# Patient Record
Sex: FEMALE | Race: WHITE | NOT HISPANIC OR LATINO | Employment: UNEMPLOYED | URBAN - METROPOLITAN AREA
[De-identification: names, ages, dates, MRNs, and addresses within clinical notes are randomized per-mention and may not be internally consistent; named-entity substitution may affect disease eponyms.]

---

## 2017-01-03 ENCOUNTER — OFFICE VISIT (OUTPATIENT)
Dept: FAMILY MEDICINE | Facility: CLINIC | Age: 62
End: 2017-01-03

## 2017-01-03 VITALS
BODY MASS INDEX: 41.98 KG/M2 | WEIGHT: 277 LBS | TEMPERATURE: 99 F | DIASTOLIC BLOOD PRESSURE: 90 MMHG | SYSTOLIC BLOOD PRESSURE: 142 MMHG | HEART RATE: 76 BPM | OXYGEN SATURATION: 98 % | HEIGHT: 68 IN

## 2017-01-03 DIAGNOSIS — E78.5 HYPERLIPIDEMIA WITH TARGET LDL LESS THAN 100: ICD-10-CM

## 2017-01-03 DIAGNOSIS — E66.01 MORBID OBESITY, UNSPECIFIED OBESITY TYPE (H): ICD-10-CM

## 2017-01-03 DIAGNOSIS — Z79.4 TYPE 2 DIABETES MELLITUS WITHOUT COMPLICATION, WITH LONG-TERM CURRENT USE OF INSULIN (H): Primary | ICD-10-CM

## 2017-01-03 DIAGNOSIS — E11.9 TYPE 2 DIABETES MELLITUS WITHOUT COMPLICATION, WITH LONG-TERM CURRENT USE OF INSULIN (H): Primary | ICD-10-CM

## 2017-01-03 LAB — HBA1C MFR BLD: 5.7 % (ref 4.3–6)

## 2017-01-03 PROCEDURE — 99207 C FOOT EXAM  NO CHARGE: CPT | Performed by: FAMILY MEDICINE

## 2017-01-03 PROCEDURE — 82043 UR ALBUMIN QUANTITATIVE: CPT | Performed by: FAMILY MEDICINE

## 2017-01-03 PROCEDURE — 80061 LIPID PANEL: CPT | Performed by: FAMILY MEDICINE

## 2017-01-03 PROCEDURE — 99214 OFFICE O/P EST MOD 30 MIN: CPT | Performed by: FAMILY MEDICINE

## 2017-01-03 PROCEDURE — 36415 COLL VENOUS BLD VENIPUNCTURE: CPT | Performed by: FAMILY MEDICINE

## 2017-01-03 PROCEDURE — 83036 HEMOGLOBIN GLYCOSYLATED A1C: CPT | Performed by: FAMILY MEDICINE

## 2017-01-03 PROCEDURE — 82565 ASSAY OF CREATININE: CPT | Performed by: FAMILY MEDICINE

## 2017-01-03 RX ORDER — INSULIN GLARGINE 100 [IU]/ML
45 INJECTION, SOLUTION SUBCUTANEOUS AT BEDTIME
Qty: 13.5 ML | Refills: 3 | Status: SHIPPED
Start: 2017-01-03 | End: 2017-08-02

## 2017-01-03 NOTE — PROGRESS NOTES
SUBJECTIVE:                                                    Evette Herzog is a 61 year old female who presents to clinic today for the following health issues:      Diabetes Follow-up    Patient is checking blood sugars: once daily.  Results are as follows:         am - 10:00am (200-250)    Diabetic concerns: None     Symptoms of hypoglycemia (low blood sugar): none     Paresthesias (numbness or burning in feet) or sores: No, they are starting to tingle a little bit     Date of last diabetic eye exam: Over 1 year     Hyperlipidemia Follow-Up      Rate your low fat/cholesterol diet?: not monitoring fat    Taking statin?  No    Other lipid medications/supplements?:  None  LDL CHOLESTEROL CALCULATED   Date Value Ref Range Status   12/14/2015 115* <100 mg/dL Final     Comment:     Above desirable:  100-129 mg/dl   Borderline High:  130-159 mg/dL   High:             160-189 mg/dL   Very high:       >189 mg/dl     ]      Weight Loss  Has lost 50 lbs, mood has improved. She has more motivation to get out and about.  She's cut back sugars and carbs.        Amount of exercise or physical activity: Cleans house and plays with dog everyday    Problems taking medications regularly: No    Medication side effects: none    Diet: regular (no restrictions)    Problem list and histories reviewed & adjusted, as indicated.  Additional history: as documented    Patient Active Problem List   Diagnosis     Insomnia     Oral lichen planus     Hyperlipidemia with target LDL less than 100     Type 2 diabetes mellitus without complication (H)     Morbid obesity, unspecified obesity type (H)     History reviewed. No pertinent past surgical history.    Social History   Substance Use Topics     Smoking status: Former Smoker     Types: Cigarettes     Smokeless tobacco: Never Used      Comment: very occasionally     Alcohol Use: Yes      Comment: 4  beers daily     Family History   Problem Relation Age of Onset     Adopted: Yes      "    Current Outpatient Prescriptions   Medication Sig Dispense Refill     insulin glargine (LANTUS) 100 UNIT/ML injection Inject 45 Units Subcutaneous At Bedtime Needs labs and office visit prior to more refills. Please profile. 13.5 mL 3     insulin syringe-needle U-100 (BD INSULIN SYRINGE ULTRAFINE) 30G X 1/2\" 0.5 ML Use one syringe twice daily or as directed. 100 each 0     STATIN NOT PRESCRIBED, INTENTIONAL, Statin not prescribed intentionally due to Other patient declines (This option does not exclude patient from measure) 0 each 0     aspirin 81 MG tablet Take 1 tablet by mouth daily.  3     ACE NOT PRESCRIBED (INTENTIONAL) not prescribed  0     FIBER PO CHEW 2 daily       MULTIVITAL OR CHEW Chew 1 tablet daily       TUMS 500 MG OR CHEW 1 TABLET 3 TIMES DAILY 90 0     [DISCONTINUED] insulin glargine (LANTUS) 100 UNIT/ML injection Inject 50 Units Subcutaneous At Bedtime Needs labs and office visit prior to more refills. 1 vial 0     No Known Allergies  Recent Labs   Lab Test  01/03/17   1120  12/14/15   0916  01/14/15   1122   01/31/14   1112   01/22/13   1221   A1C  5.7  6.2*  7.0*   < >  6.8*   < >  7.9*   LDL   --   115*  98   --   123   --   123   HDL   --   51  38*   --   50   --   46*   TRIG   --   168*  173*   --   186*   --   211*   ALT   --    --   23   --   59*   --   42   CR   --   0.71  0.56   --   0.63   --   0.60   GFRESTIMATED   --   84  >90  Non  GFR Calc     --   >90   --   >90   GFRESTBLACK   --   >90   GFR Calc    >90   GFR Calc     --   >90   --   >90   POTASSIUM   --   4.2  4.0   --   4.1   --   4.5   TSH   --   6.66*   --    --   4.75   --   2.78    < > = values in this interval not displayed.      BP Readings from Last 3 Encounters:   01/03/17 142/90   12/14/15 124/82   01/14/15 124/70    Wt Readings from Last 3 Encounters:   01/03/17 277 lb (125.646 kg)   12/14/15 296 lb 8 oz (134.492 kg)   01/14/15 305 lb 8 oz (138.574 kg)                " "    ROS:  Constitutional, HEENT, cardiovascular, pulmonary, GI, , musculoskeletal, neuro, skin, endocrine and psych systems are negative, except as otherwise noted.    OBJECTIVE:                                                    /90 mmHg  Pulse 76  Temp(Src) 99  F (37.2  C) (Tympanic)  Ht 5' 7.5\" (1.715 m)  Wt 277 lb (125.646 kg)  BMI 42.72 kg/m2  SpO2 98%  Body mass index is 42.72 kg/(m^2).  GENERAL: healthy, alert and no distress  EYES: Eyes grossly normal to inspection, PERRL and conjunctivae and sclerae normal  NECK: no adenopathy, no asymmetry, masses, or scars and thyroid normal to palpation  RESP: lungs clear to auscultation - no rales, rhonchi or wheezes  CV: regular rate and rhythm, normal S1 S2, no S3 or S4, no murmur, click or rub, no peripheral edema and peripheral pulses strong  ABDOMEN: soft, nontender, no hepatosplenomegaly, no masses and bowel sounds normal  MS: no gross musculoskeletal defects noted, no edema  NEURO: Normal strength and tone, mentation intact and speech normal  PSYCH: mentation appears normal, affect normal/bright  Diabetic foot exam: normal DP and PT pulses, no trophic changes or ulcerative lesions and normal sensory exam    Diagnostic Test Results:  Results for orders placed or performed in visit on 01/03/17 (from the past 24 hour(s))   Hemoglobin A1c   Result Value Ref Range    Hemoglobin A1C 5.7 4.3 - 6.0 %        ASSESSMENT/PLAN:                                                    1. Hyperlipidemia with target LDL less than 100  LDL CHOLESTEROL CALCULATED   Date Value Ref Range Status   12/14/2015 115* <100 mg/dL Final     Comment:     Above desirable:  100-129 mg/dl   Borderline High:  130-159 mg/dL   High:             160-189 mg/dL   Very high:       >189 mg/dl     ] not at goal, but ongoing weight loss, diet changes, recheck today, patient declines statin, recommend omega-3 fatty acids if she desires  - Lipid panel reflex to direct LDL    2. Type 2 diabetes " mellitus without complication, with long-term current use of insulin (H)  Doing very well, stopped glipizide and reduced lantus from 50 to 45 units qhs, patient to follow blood sugars and let me know  - insulin glargine (LANTUS) 100 UNIT/ML injection; Inject 45 Units Subcutaneous At Bedtime Needs labs and office visit prior to more refills. Please profile.  Dispense: 13.5 mL; Refill: 3  - FOOT EXAM    3. Morbid obesity, unspecified obesity type (H)  Wt Readings from Last 4 Encounters:   01/03/17 277 lb (125.646 kg)   12/14/15 296 lb 8 oz (134.492 kg)   01/14/15 305 lb 8 oz (138.574 kg)   06/20/14 320 lb (145.151 kg)    excellent ongoing weight loss, keep up great job!      Return to clinic 6 months  Reminded to schedule preventive cares, she has poor health insurance, recommended JALEN, Please see patient instructions below.     Health Maintenance Due   Topic Date Due     PNEUMOVAX 1X HI RISK PATIENT < 65 (NO IB MSG)  01/31/1956     HEPATITIS C SCREENING  01/31/1972     PAP SCREENING Q3 YR (SYSTEM ASSIGNED)  01/31/1975     MAMMO SCREEN Q2 YR (SYSTEM ASSIGNED)  01/31/1994     COLON CANCER SCREEN (SYSTEM ASSIGNED)  01/31/2004     FOOT EXAM Q1 YEAR( NO INBASKET)  06/20/2015     A1C Q6 MO( NO INBASKET)  06/14/2016     EYE EXAM Q1 YEAR( NO INBASKET)  07/09/2016     CREATININE Q1 YEAR (NO INBASKET)  12/14/2016     LIPID MONITORING Q1 YEAR( NO INBASKET)  12/14/2016     MICROALBUMIN Q1 YEAR( NO INBASKET)  12/14/2016      Pneumovax recommended today, patient declined.    Melva Johnson MD  [facility]

## 2017-01-03 NOTE — PATIENT INSTRUCTIONS
Results for orders placed or performed in visit on 01/03/17   Hemoglobin A1c   Result Value Ref Range    Hemoglobin A1C 5.7 4.3 - 6.0 %      [facility]- Breast and Cervical Cancer Screening  Agency: [facility]  [address] [place] [place]  [place], [place], [address]   [phone]  Service Description  The [facility] Screening Program (formerly the [place] Breast and Cervical Cancer Control Program) is a statewide comprehensive breast and cervical cancer screening program whose primary objective is to increase the proportion of age-appropriate women who are screened for breast and cervical cancer by providing free screening and follow-up services to uninsured and underinsured women.    The following services are free of charge to eligible women.    Screening services  - Office visit for breast and/or cervical exam  - Screening mammogram (breast exam required)  - Pap smear    Diagnostic services  - Office visit for breast or cervical services (e.g. for exam or results counseling)  - Diagnostic mammogram  - Fine needle aspiration of breast lump, including pathology reading  - Colposcopy, including biopsy  - Endometrial Biopsy (when done as a follow-up for a [facility]ram-covered Pap with abnormal results of Endometrial cells or Adenocarcinoma)  - High Risk Human Papillomavirus (HPV) Panels (when done as a follow-up for a [facility]-covered Pap with Atypical Cells of Undetermined Significance (ASCUS) results)  - Breast ultrasound  - Breast biopsy (with prior arrangement)  Eligibility  Women who meet all three criteria are eligible: Age 40 or older (see exception below*); Have no insurance or are underinsured**; Meet income guidelines ([email or web address]/[email or web address]/services.[email or web address])    * The [facility] Program recognizes that there are some situations where services are indicated in younger women. If a woman is determined by a clinician to be at elevated risk for breast cancer, [facility] will cover her office  visit and mammogram. If further follow-up is needed, the woman could also have a diagnostic mammogram, breast ultrasound, or outpatient breast biopsy.    ** Underinsured includes: Insurance that does not cover screening or insurance with unmet deductibles or copayments. Women on Medicare where there are uncovered expenses associated with the visit, Pap, or mammogram. Women that are on either [place] or Medical Assistance are not eligible for [employer] (because both cover these screening services).  Application Instructions  To assist in determining eligibility and scheduling an appointment for a free breast and cervical cancer screening appointment at a participating [employer] clinic, fill in the form at [email or web address].    For [place] information call  at [phone] or Toll Free [phone] ([phone])      .You are due for a mammogram. Please call [phone] to schedule one at your earliest convenience, thank you!

## 2017-01-03 NOTE — PROGRESS NOTES
Quick Note:    Patient was seen today in clinic. I discussed results in clinic, please see clinic progress note.    Melva Johnson 1/3/2017  ______

## 2017-01-03 NOTE — MR AVS SNAPSHOT
After Visit Summary   1/3/2017    Evette Herzog    MRN: [id number]           Patient Information     Date Of Birth          1955        Visit Information        Provider Department      1/3/2017 1:40 PM Melva Johnson MD [facility]        Today's Diagnoses     Type 2 diabetes mellitus without complication, with long-term current use of insulin (H)    -  1     Hyperlipidemia with target LDL less than 100           Care Instructions    Results for orders placed or performed in visit on 01/03/17   Hemoglobin A1c   Result Value Ref Range    Hemoglobin A1C 5.7 4.3 - 6.0 %      [facility]- Breast and Cervical Cancer Screening  Agency: [facility]  [address] [place], [address]   [phone]  Service Description  The [facility] Screening Program (formerly the [place] Breast and Cervical Cancer Control Program) is a statewide comprehensive breast and cervical cancer screening program whose primary objective is to increase the proportion of age-appropriate women who are screened for breast and cervical cancer by providing free screening and follow-up services to uninsured and underinsured women.    The following services are free of charge to eligible women.    Screening services  - Office visit for breast and/or cervical exam  - Screening mammogram (breast exam required)  - Pap smear    Diagnostic services  - Office visit for breast or cervical services (e.g. for exam or results counseling)  - Diagnostic mammogram  - Fine needle aspiration of breast lump, including pathology reading  - Colposcopy, including biopsy  - Endometrial Biopsy (when done as a follow-up for a [facility]ram-covered Pap with abnormal results of Endometrial cells or Adenocarcinoma)  - High Risk Human Papillomavirus (HPV) Panels (when done as a follow-up for a [facility]-covered Pap with Atypical Cells of Undetermined Significance (ASCUS) results)  - Breast ultrasound  - Breast biopsy (with  prior arrangement)  Eligibility  Women who meet all three criteria are eligible: Age 40 or older (see exception below*); Have no insurance or are underinsured**; Meet income guidelines ([email or web address]/[email or web address]/[email or web address])    * The [facility] Program recognizes that there are some situations where services are indicated in younger women. If a woman is determined by a clinician to be at elevated risk for breast cancer, [facility] will cover her office visit and mammogram. If further follow-up is needed, the woman could also have a diagnostic mammogram, breast ultrasound, or outpatient breast biopsy.    ** Underinsured includes: Insurance that does not cover screening or insurance with unmet deductibles or copayments. Women on Medicare where there are uncovered expenses associated with the visit, Pap, or mammogram. Women that are on either [place] or Medical Assistance are not eligible for [facility] (because both cover these screening services).  Application Instructions  To assist in determining eligibility and scheduling an appointment for a free breast and cervical cancer screening appointment at a participating [place] clinic, fill in the form at [email or web address].    For [place] information call  at [phone] or Toll Free [phone] ([phone])          Follow-ups after your visit        Who to contact     If you have questions or need follow up information about today's clinic visit or your schedule please contact [facility] directly at [phone].  Normal or non-critical lab and imaging results will be communicated to you by MyChart, letter or phone within 4 business days after the clinic has received the results. If you do not hear from us within 7 days, please contact the clinic through MyChart or phone. If you have a critical or abnormal lab result, we will notify you by phone as soon as  "possible.  Submit refill requests through [employer] or call your pharmacy and they will forward the refill request to us. Please allow 3 business days for your refill to be completed.          Additional Information About Your Visit        [employer] Information     [employer] gives you secure access to your electronic health record. If you see a primary care provider, you can also send messages to your care team and make appointments. If you have questions, please call your primary care clinic.  If you do not have a primary care provider, please call [phone] and they will assist you.        Care EveryWhere ID     This is your Care EveryWhere ID. This could be used by other organizations to access your [facility] medical records  [id number]        Your Vitals Were     Pulse Temperature Height BMI (Body Mass Index) Pulse Oximetry       76 99  F (37.2  C) (Tympanic) 5' 7.5\" (1.715 m) 42.72 kg/m2 98%        Blood Pressure from Last 3 Encounters:   01/03/17 142/90   12/14/15 124/82   01/14/15 124/70    Weight from Last 3 Encounters:   01/03/17 277 lb (125.646 kg)   12/14/15 296 lb 8 oz (134.492 kg)   01/14/15 305 lb 8 oz (138.574 kg)              We Performed the Following     Albumin Random Urine Quantitative     Creatinine     FOOT EXAM     Hemoglobin A1c     Lipid panel reflex to direct LDL          Today's Medication Changes          These changes are accurate as of: 1/3/17  2:39 PM.  If you have any questions, ask your nurse or doctor.               These medicines have changed or have updated prescriptions.        Dose/Directions    insulin glargine 100 UNIT/ML injection   Commonly known as:  LANTUS   This may have changed:    - how much to take  - additional instructions   Used for:  Type 2 diabetes mellitus without complication, with long-term current use of insulin (H)   Changed by:  Melva Johnson MD        Dose:  45 Units   Inject 45 Units Subcutaneous At Bedtime Needs labs and office visit prior to " "more refills. Please profile.   Quantity:  13.5 mL   Refills:  3            Where to get your medicines      These medications were sent to [facility] - [place], [place] - [address]  [address], [facility] [address]     Phone:  [phone]    - insulin glargine 100 UNIT/ML injection             Primary Care Provider Office Phone # Fax #    Melva Johnson [phone]       [facility] [address]  [facility] [address]        Thank you!     Thank you for choosing [facility]  for your care. Our goal is always to provide you with excellent care. Hearing back from our patients is one way we can continue to improve our services. Please take a few minutes to complete the written survey that you may receive in the mail after your visit with us. Thank you!             Your Updated Medication List - Protect others around you: Learn how to safely use, store and throw away your medicines at www.disposemymeds.org.          This list is accurate as of: 1/3/17  2:39 PM.  Always use your most recent med list.                   Brand Name Dispense Instructions for use    ACE NOT PRESCRIBED (INTENTIONAL)      not prescribed       aspirin 81 MG tablet      Take 1 tablet by mouth daily.       Fiber Chew      2 daily       insulin glargine 100 UNIT/ML injection    LANTUS    13.5 mL    Inject 45 Units Subcutaneous At Bedtime Needs labs and office visit prior to more refills. Please profile.       insulin syringe-needle U-100 30G X 1/2\" 0.5 ML    BD insulin syringe ULTRAFINE    100 each    Use one syringe twice daily or as directed.       MULTIVITAL Chew      Chew 1 tablet daily       STATIN NOT PRESCRIBED (INTENTIONAL)     0 each    Statin not prescribed intentionally due to Other patient declines (This option does not exclude patient from measure)       TUMS 500 MG chewable tablet   Generic drug:  calcium carbonate     90    1 TABLET 3 TIMES DAILY       "

## 2017-01-03 NOTE — NURSING NOTE
"Chief Complaint   Patient presents with     Diabetes     Hyperlipidemia       Initial /90 mmHg  Pulse 76  Temp(Src) 99  F (37.2  C) (Tympanic)  Ht 5' 7.5\" (1.715 m)  Wt 277 lb (125.646 kg)  BMI 42.72 kg/m2  SpO2 98% Estimated body mass index is 42.72 kg/(m^2) as calculated from the following:    Height as of this encounter: 5' 7.5\" (1.715 m).    Weight as of this encounter: 277 lb (125.646 kg).  BP completed using cuff size: neo [place] MA    "

## 2017-01-04 LAB
CHOLEST SERPL-MCNC: 183 MG/DL
CREAT SERPL-MCNC: 0.74 MG/DL (ref 0.52–1.04)
CREAT UR-MCNC: 295 MG/DL
GFR SERPL CREATININE-BSD FRML MDRD: 80 ML/MIN/1.7M2
HDLC SERPL-MCNC: 42 MG/DL
LDLC SERPL CALC-MCNC: 107 MG/DL
MICROALBUMIN UR-MCNC: 27 MG/L
MICROALBUMIN/CREAT UR: 9.29 MG/G CR (ref 0–25)
NONHDLC SERPL-MCNC: 141 MG/DL
TRIGL SERPL-MCNC: 168 MG/DL

## 2017-01-05 NOTE — PROGRESS NOTES
"Quick Note:    Thank you for getting labs done. If you have any questions, please contact the clinic or schedule an appointment with me, thank you!    Your microalbumen is normal, which is great news. This means you do not have protein in the urine, and that your kidneys are currently functioning well. Keeping blood pressure and blood fats low is the best way to preserve your kidney function over your lifetime.     Your LDL, or bad cholesterol, is not at goal. LDL is also called \"bad cholesterol\" because it contributes to heart disease and stroke. If you aren't already, I recommend increasing whole grains with every meal, and adding flax seed to your food.     One easy way to do this is to eat oatmeal every morning. Steel cut oats take longer to cook but are better for you than instant, but any oatmeal at all improves your cholesterol. You can add flax seed to oatmeal, yoghurt, applesauce and other soft foods; or include it in food that you make including baked goods.    If you are able to make changes, I recommend rechecking your fasting lipids in about 2-3 months to see if your cholesterol has improved.    If you have any questions, please contact the clinic or schedule an appointment with me, thank you!    Melva Johnson   1/4/2017  ______  "

## 2017-02-21 ENCOUNTER — TRANSFERRED RECORDS (OUTPATIENT)
Dept: HEALTH INFORMATION MANAGEMENT | Facility: CLINIC | Age: 62
End: 2017-02-21

## 2017-05-04 DIAGNOSIS — E11.9 TYPE 2 DIABETES MELLITUS WITHOUT COMPLICATION (H): Primary | ICD-10-CM

## 2017-05-05 RX ORDER — SYRINGE-NEEDLE,INSULIN,0.5 ML 31 GX5/16"
SYRINGE, EMPTY DISPOSABLE MISCELLANEOUS
Qty: 100 EACH | Refills: 0 | Status: SHIPPED | OUTPATIENT
Start: 2017-05-05 | End: 2017-08-10

## 2017-05-05 NOTE — TELEPHONE ENCOUNTER
Prescription approved per [facility] Refill Protocol.  COLLIN Toledo, BSN, RN

## 2017-05-05 NOTE — TELEPHONE ENCOUNTER
"Medication Detail      Disp Refills Start End CARMEN   insulin syringe-needle U-100 (BD INSULIN SYRINGE ULTRAFINE) 30G X 1/2\" 0.5  each 0 12/15/2016  No   Sig: Use one syringe twice daily or as directed.   Class: Fax   Notes to Pharmacy: Keep office visit scheduled for Dec 22.   Order: [id number]        Last Office Visit with [facility], P or [facility] prescribing provider: 1/3/2017                                               "

## 2017-05-23 ENCOUNTER — TELEPHONE (OUTPATIENT)
Dept: FAMILY MEDICINE | Facility: CLINIC | Age: 62
End: 2017-05-23

## 2017-05-23 NOTE — TELEPHONE ENCOUNTER
Call Regarding Preventive Health Screening Colonoscopy, Mammogram and Cervical/PAP    Attempt 1    Message on voicemail     Comments:       Outreach   Mikaela Hopper

## 2017-06-02 NOTE — TELEPHONE ENCOUNTER
6/1/2017    Call Regarding Preventive Health Screening Colonoscopy, Mammogram and Cervical/PAP    Attempt 2    Message on voicemail     Comments:       Outreach   CC

## 2017-06-07 NOTE — TELEPHONE ENCOUNTER
6/7/2017    Call Regarding Preventive Health Screening Colonoscopy, Mammogram and Cervical/PAP    Attempt 3    Message on voicemail     Comments:       Outreach   CC

## 2017-06-17 ENCOUNTER — HEALTH MAINTENANCE LETTER (OUTPATIENT)
Age: 62
End: 2017-06-17

## 2017-07-14 ENCOUNTER — TELEPHONE (OUTPATIENT)
Dept: FAMILY MEDICINE | Facility: CLINIC | Age: 62
End: 2017-07-14

## 2017-07-14 NOTE — TELEPHONE ENCOUNTER
BP Readings from Last 3 Encounters:   01/03/17 142/90   12/14/15 124/82   01/14/15 124/70      LDL Cholesterol Calculated   Date Value Ref Range Status   01/03/2017 107 (H) <100 mg/dL Final     Comment:     Above desirable:  100-129 mg/dl   Borderline High:  130-159 mg/dL   High:             160-189 mg/dL   Very high:       >189 mg/dl     12/14/2015 115 (H) <100 mg/dL Final     Comment:     Above desirable:  100-129 mg/dl   Borderline High:  130-159 mg/dL   High:             160-189 mg/dL   Very high:       >189 mg/dl       Health Maintenance Due   Topic Date Due     PNEUMOVAX 1X HI RISK PATIENT < 65 (NO IB MSG)  01/31/1956     HEPATITIS C SCREENING  01/31/1972     PAP SCREENING Q3 YR (SYSTEM ASSIGNED)  01/31/1975     MAMMO SCREEN Q2 YR (SYSTEM ASSIGNED)  01/31/1994     COLON CANCER SCREEN (SYSTEM ASSIGNED)  01/31/2004     ADVANCE DIRECTIVE PLANNING Q5 YRS  04/03/2017     A1C Q6 MO  07/03/2017      Please call Evette and ask her to schedule a physical/pap/ htn visit with me, thanks! -Melva

## 2017-07-17 NOTE — TELEPHONE ENCOUNTER
Spoke to patient and she does not have insurance so she can not come in to be seen she can not afford it she also made a comment that you did not give her a year supply of her insulin and said she will just have to figure it out

## 2017-07-26 ENCOUNTER — CARE COORDINATION (OUTPATIENT)
Dept: CARE COORDINATION | Facility: CLINIC | Age: 62
End: 2017-07-26

## 2017-07-26 NOTE — LETTER
[facility] CARE COORDINATION  [address] [facility] [address]  Phone: [phone]      August 4, 2017      Evette Herzog  [address] [place] [id number]    Dear Evette,    We have been trying to reach you to introduce you to [facility] Care Coordination program.  The goal of care coordination is to help you manage your health and improve access to the [facility] system in the most efficient manner.  The Care Coordinator is a nurse who understands the healthcare system and will assist you in improving your access to care.     As your Physician and Care Coordinator we partner to help you achieve your health care goals.     We will continue to reach out; however, if you are able to call your Care Coordinator at [phone]  , that would be appreciated.  We at [facility] are focused on providing you with the highest-quality healthcare experience possible.      It is a pleasure to partner with you as we work towards achieving your optimal state of wellness.        Sincerely,        Melva Bolanos RN, MD  [facility] [address] [place] [address] / [facility] 5*

## 2017-07-26 NOTE — PROGRESS NOTES
RN CC reviewed EMR and Care Coordination is no longer needed at this time  Pt closed to RN CC at this time  Karina Ron RN Clinic Care Coordinator  [facility] [phone]

## 2017-07-28 NOTE — PROGRESS NOTES
RN CC noticed telephone note from 7/14/17 where pt is in need of medication assistance  RN CC LMOVM for pt to call RN CC back  Karina Ron RN Clinic Care Coordinator  [facility] [phone]

## 2017-08-02 NOTE — PROGRESS NOTES
Reason for Call:  Other call back    Detailed comments: Patient would like a call back to discuss the reason why she was only given 5 months worth of insulin instead of a year. Does not want to come in for an office visit because she does not have insurance.    Phone Number Patient can be reached at: Home number on file [phone] (home)    Best Time: anytime    Can we leave a detailed message on this number? YES    Call taken on 8/2/2017 at 10:04 AM by Emilie Sullivan

## 2017-08-04 ENCOUNTER — TELEPHONE (OUTPATIENT)
Dept: FAMILY MEDICINE | Facility: CLINIC | Age: 62
End: 2017-08-04

## 2017-08-04 DIAGNOSIS — Z79.4 TYPE 2 DIABETES MELLITUS WITHOUT COMPLICATION, WITH LONG-TERM CURRENT USE OF INSULIN (H): ICD-10-CM

## 2017-08-04 DIAGNOSIS — E11.9 TYPE 2 DIABETES MELLITUS WITHOUT COMPLICATION, WITH LONG-TERM CURRENT USE OF INSULIN (H): ICD-10-CM

## 2017-08-04 NOTE — TELEPHONE ENCOUNTER
"Pt called [facility] pharmacy today to see if her insulin is ready, which it is, but only for 1 vial (she wanted 4) I explained to pt that the reason for this is that she is due for labs and has not been at goal.  I read the previous phone encounter (and essentially had a carbon copy of it with pt).  The only thing I can suggest is enrolling pt in bp-gap program with [facility] (she has gone there before) that way it wouldn't cost anything to check her bp and they only have a manual cuff as opposed to a machine that \"hurts\" pt.  She didn't seem opposed to getting her blood drawn for A1C or cholesterol, so maybe we can use this as a means to get her in to be seen.      She is asking to have the qty of this refill changed to 4 vials, stating that she can pay for it \"now\"     If that is something we can do, I am sure it will create a remarkable experience ;)     Thanks!  Richar Case [facility]- Float Technician  For [facility]   "

## 2017-08-04 NOTE — PROGRESS NOTES
Clinic Care Coordination Contact  [facility]/Voicemail    Referral Source: PCS routed Email to RN CC who tried to contact pt however there is no way to LM for pt as her voicemail is not set up  Clinical Data: Care Coordinator Outreach  Outreach attempted x 1.  Left message on voicemail with call back information and requested return call.  Plan: Care Coordinator will mail out care coordination introduction letter with care coordinator contact information and explanation of care coordination services. Care Coordinator will try to reach patient again in 1-2 business days.  Karina Ron RN Clinic Care Coordinator  [facility] [phone]

## 2017-08-04 NOTE — TELEPHONE ENCOUNTER
Karina -  Could you contact this patient again regarding financial assistance?  Thanks,  Cici Hernandez RN

## 2017-08-07 RX ORDER — INSULIN GLARGINE 100 [IU]/ML
45 INJECTION, SOLUTION SUBCUTANEOUS AT BEDTIME
Qty: 45 ML | Refills: 1 | Status: SHIPPED | OUTPATIENT
Start: 2017-08-07 | End: 2018-01-22

## 2017-08-08 DIAGNOSIS — E11.9 TYPE 2 DIABETES MELLITUS WITHOUT COMPLICATION (H): ICD-10-CM

## 2017-08-08 RX ORDER — SYRINGE-NEEDLE,INSULIN,0.5 ML 27GX1/2"
SYRINGE, EMPTY DISPOSABLE MISCELLANEOUS
Qty: 100 EACH | Refills: 0 | Status: CANCELLED | OUTPATIENT
Start: 2017-08-08

## 2017-08-08 NOTE — TELEPHONE ENCOUNTER
ASSESSMENT / PLAN:  (E11.9,  Z79.4) Type 2 diabetes mellitus without complication, with long-term current use of insulin (H)  Comment: I did send order for 45 ml which should last 3 months.  Plan: insulin glargine (LANTUS VIAL) 100 UNIT/ML         injection          Agree with referral to Karina to see what assistance might be available.    Sincerely,  Dr. Melva Johnson MD  8/7/2017

## 2017-08-08 NOTE — TELEPHONE ENCOUNTER
"Insulin syringe-needle U-100 (B-D insulin syringe) 31g x 5/16\" 0.5ml      Last Written Prescription Date: 05-05-17  Last Fill Quantity: 100,  # refills: 0   Last Office Visit with [facility], [facility] or [facility] prescribing provider: 01-03-17    I forgot to include this in my last request, neda Case [facility]- Float Technician  For [facility]                                              "

## 2017-08-10 RX ORDER — SYRINGE-NEEDLE,INSULIN,0.5 ML 27GX1/2"
SYRINGE, EMPTY DISPOSABLE MISCELLANEOUS
Qty: 30 EACH | Refills: 0 | Status: SHIPPED | OUTPATIENT
Start: 2017-08-10 | End: 2018-01-22

## 2017-08-10 NOTE — TELEPHONE ENCOUNTER
--  --Please call Evette and ask her to schedule a diabetes visit as planned in last office visit with Elizabeth.  A refill order for below medication  was sent to the pharm.     Thank you,  Emily Guan RN      Last OV : 1/3/17 - Return to clinic 6 months

## 2018-01-22 ENCOUNTER — OFFICE VISIT (OUTPATIENT)
Dept: FAMILY MEDICINE | Facility: CLINIC | Age: 63
End: 2018-01-22

## 2018-01-22 VITALS
SYSTOLIC BLOOD PRESSURE: 144 MMHG | RESPIRATION RATE: 16 BRPM | HEART RATE: 83 BPM | TEMPERATURE: 97.8 F | BODY MASS INDEX: 44.9 KG/M2 | OXYGEN SATURATION: 93 % | DIASTOLIC BLOOD PRESSURE: 79 MMHG | WEIGHT: 291 LBS

## 2018-01-22 DIAGNOSIS — Z11.59 ENCOUNTER FOR HCV SCREENING TEST FOR LOW RISK PATIENT: ICD-10-CM

## 2018-01-22 DIAGNOSIS — E66.01 MORBID OBESITY, UNSPECIFIED OBESITY TYPE (H): ICD-10-CM

## 2018-01-22 DIAGNOSIS — E11.9 TYPE 2 DIABETES MELLITUS WITHOUT COMPLICATION, WITH LONG-TERM CURRENT USE OF INSULIN (H): Primary | ICD-10-CM

## 2018-01-22 DIAGNOSIS — Z79.4 TYPE 2 DIABETES MELLITUS WITHOUT COMPLICATION, WITH LONG-TERM CURRENT USE OF INSULIN (H): Primary | ICD-10-CM

## 2018-01-22 LAB — HBA1C MFR BLD: 6 % (ref 4.3–6)

## 2018-01-22 PROCEDURE — 84443 ASSAY THYROID STIM HORMONE: CPT | Performed by: FAMILY MEDICINE

## 2018-01-22 PROCEDURE — 86803 HEPATITIS C AB TEST: CPT | Performed by: FAMILY MEDICINE

## 2018-01-22 PROCEDURE — 99214 OFFICE O/P EST MOD 30 MIN: CPT | Performed by: FAMILY MEDICINE

## 2018-01-22 PROCEDURE — 36415 COLL VENOUS BLD VENIPUNCTURE: CPT | Performed by: FAMILY MEDICINE

## 2018-01-22 PROCEDURE — 83036 HEMOGLOBIN GLYCOSYLATED A1C: CPT | Performed by: FAMILY MEDICINE

## 2018-01-22 PROCEDURE — 82043 UR ALBUMIN QUANTITATIVE: CPT | Performed by: FAMILY MEDICINE

## 2018-01-22 PROCEDURE — 80061 LIPID PANEL: CPT | Performed by: FAMILY MEDICINE

## 2018-01-22 PROCEDURE — 80053 COMPREHEN METABOLIC PANEL: CPT | Performed by: FAMILY MEDICINE

## 2018-01-22 RX ORDER — INSULIN GLARGINE 100 [IU]/ML
45 INJECTION, SOLUTION SUBCUTANEOUS AT BEDTIME
Qty: 45 ML | Refills: 11 | Status: SHIPPED | OUTPATIENT
Start: 2018-01-22 | End: 2019-05-21

## 2018-01-22 RX ORDER — METFORMIN HCL 500 MG
1000 TABLET, EXTENDED RELEASE 24 HR ORAL 2 TIMES DAILY
Qty: 120 TABLET | Refills: 11 | Status: SHIPPED | OUTPATIENT
Start: 2018-01-22 | End: 2019-05-21

## 2018-01-22 RX ORDER — SYRINGE-NEEDLE,INSULIN,0.5 ML 27GX1/2"
SYRINGE, EMPTY DISPOSABLE MISCELLANEOUS
Qty: 30 EACH | Refills: 11 | Status: SHIPPED | OUTPATIENT
Start: 2018-01-22 | End: 2019-05-21

## 2018-01-22 NOTE — PATIENT INSTRUCTIONS
"ASSESSMENT AND PLAN  1. Type 2 diabetes mellitus without complication, with long-term current use of insulin (H)  a1c at goal.      No health insurance until one year from now which limits options.     Plan:    Desires a trial of extended release metformin again for diabetes/weight loss.  Didn't tolerate short acting before.     - start metformin xr 500mg daily for 2-3 weeks then increase to twice daily for 2-3 weeks then increase to two pills twice daily.     Refilled lantus at 45 units daily.  If getting too low (less than 70 fasting) then decrease by 5 units and call me or send No[employer] message.     Will update plans ater other labs return.     Did driving form and will fax.       - Comprehensive metabolic panel  - Lipid panel reflex to direct LDL Fasting  - Hemoglobin A1c  - TSH with free T4 reflex  - Albumin Random Urine Quantitative with Creat Ratio  - metFORMIN (GLUCOPHAGE-XR) 500 MG 24 hr tablet; Take 2 tablets (1,000 mg) by mouth 2 times daily  Dispense: 120 tablet; Refill: 11  - insulin glargine (LANTUS VIAL) 100 UNIT/ML injection; Inject 45 Units Subcutaneous At Bedtime  Dispense: 45 mL; Refill: 11  - insulin syringe-needle U-100 (B-D INSULIN SYRINGE) 31G X 5/16\" 0.5 ML; USE ONE SYRINGE ONCE DAILY OR AS DIRECTED  Dispense: 30 each; Refill: 11    2. Morbid obesity, unspecified obesity type (H)    - Comprehensive metabolic panel  - metFORMIN (GLUCOPHAGE-XR) 500 MG 24 hr tablet; Take 2 tablets (1,000 mg) by mouth 2 times daily  Dispense: 120 tablet; Refill: 11    3. Encounter for HCV screening test for low risk patient    - Hepatitis C Screen Reflex to HCV RNA Quant and Genotype        [facility] SIGNUP FOR E-VISITS AND EASIER COMMUNICATION:  [email or web address]     Zipnosis:  MediSwipe.E[employer]Source [employer].DanceJam.  Sign up for e-visits for common illnesses!     RADIOLOGY:   [facility]:  [phone] to schedule any radiology tests at [facility]: [phone]    Mammograms/colonoscopies:  " [phone]    CONSUMER PRICE LINE for estimates of test costs:  [phone]

## 2018-01-22 NOTE — NURSING NOTE
"Chief Complaint   Patient presents with     Diabetes       Initial /79  Pulse 83  Temp 97.8  F (36.6  C) (Oral)  Resp 16  Wt 291 lb (132 kg)  SpO2 93%  BMI 44.9 kg/m2 Estimated body mass index is 44.9 kg/(m^2) as calculated from the following:    Height as of 1/3/17: 5' 7.5\" (1.715 m).    Weight as of this encounter: 291 lb (132 kg).  Medication Reconciliation: complete     Sloan Washburn MA      "

## 2018-01-22 NOTE — MR AVS SNAPSHOT
"              After Visit Summary   1/22/2018    Evette Herzog    MRN: [id number]           Patient Information     Date Of Birth          1955        Visit Information        Provider Department      1/22/2018 11:20 AM Wegener, Joel Daniel Irwin, MD [facility]        Today's Diagnoses     Type 2 diabetes mellitus without complication, with long-term current use of insulin (H)    -  1    Morbid obesity, unspecified obesity type (H)        Encounter for HCV screening test for low risk patient          Care Instructions    ASSESSMENT AND PLAN  1. Type 2 diabetes mellitus without complication, with long-term current use of insulin (H)  a1c at goal.      No health insurance until one year from now which limits options.     Plan:    Desires a trial of extended release metformin again for diabetes/weight loss.  Didn't tolerate short acting before.     - start metformin xr 500mg daily for 2-3 weeks then increase to twice daily for 2-3 weeks then increase to two pills twice daily.     Refilled lantus at 45 units daily.  If getting too low (less than 70 fasting) then decrease by 5 units and call me or send [place] message.     Will update plans ater other labs return.     Did driving form and will fax.       - Comprehensive metabolic panel  - Lipid panel reflex to direct LDL Fasting  - Hemoglobin A1c  - TSH with free T4 reflex  - Albumin Random Urine Quantitative with Creat Ratio  - metFORMIN (GLUCOPHAGE-XR) 500 MG 24 hr tablet; Take 2 tablets (1,000 mg) by mouth 2 times daily  Dispense: 120 tablet; Refill: 11  - insulin glargine (LANTUS VIAL) 100 UNIT/ML injection; Inject 45 Units Subcutaneous At Bedtime  Dispense: 45 mL; Refill: 11  - insulin syringe-needle U-100 (B-D INSULIN SYRINGE) 31G X 5/16\" 0.5 ML; USE ONE SYRINGE ONCE DAILY OR AS DIRECTED  Dispense: 30 each; Refill: 11    2. Morbid obesity, unspecified obesity type (H)    - Comprehensive metabolic panel  - metFORMIN (GLUCOPHAGE-XR) 500 MG 24 hr " tablet; Take 2 tablets (1,000 mg) by mouth 2 times daily  Dispense: 120 tablet; Refill: 11    3. Encounter for HCV screening test for low risk patient    - Hepatitis C Screen Reflex to HCV RNA Quant and Genotype        [facility] SIG[facility]P FOR E-VISITS AND EASIER COMMUNICATION:  [email or web address]     Zipnosis:  P[employer]is.Bitglass.Mcor Technologies.  Sign up for e-visits for common illnesses!     RADIOLOGY:   [facility]:  [phone] to schedule any radiology tests at [facility]: [phone]    Mammograms/colonoscopies:  [phone]    CONSUMER PRICE LINE for estimates of test costs:  [phone]               Follow-ups after your visit        Who to contact     If you have questions or need follow up information about today's clinic visit or your schedule please contact [facility] directly at [phone].  Normal or non-critical lab and imaging results will be communicated to you by GiveCorpshart, letter or phone within 4 business days after the clinic has received the results. If you do not hear from us within 7 days, please contact the clinic through [employer] or phone. If you have a critical or abnormal lab result, we will notify you by phone as soon as possible.  Submit refill requests through [employer] or call your pharmacy and they will forward the refill request to us. Please allow 3 business days for your refill to be completed.          Additional Information About Your Visit        GiveCorpshart Information     [employer] gives you secure access to your electronic health record. If you see a primary care provider, you can also send messages to your care team and make appointments. If you have questions, please call your primary care clinic.  If you do not have a primary care provider, please call [phone] and they will assist you.        Care EveryWhere ID     This is your Care EveryWhere ID. This could be used by other organizations to access your [facility]  records  [id number]        Your Vitals Were     Pulse Temperature Respirations Pulse Oximetry BMI (Body Mass Index)       83 97.8  F (36.6  C) (Oral) 16 93% 44.9 kg/m2        Blood Pressure from Last 3 Encounters:   01/22/18 144/79   01/03/17 142/90   12/14/15 124/82    Weight from Last 3 Encounters:   01/22/18 291 lb (132 kg)   01/03/17 277 lb (125.6 kg)   12/14/15 296 lb 8 oz (134.5 kg)              We Performed the Following     Albumin Random Urine Quantitative with Creat Ratio     Comprehensive metabolic panel     Hemoglobin A1c     Hepatitis C Screen Reflex to HCV RNA Quant and Genotype     Lipid panel reflex to direct LDL Fasting     TSH with free T4 reflex          Today's Medication Changes          These changes are accurate as of: 1/22/18 12:31 PM.  If you have any questions, ask your nurse or doctor.               Start taking these medicines.        Dose/Directions    metFORMIN 500 MG 24 hr tablet   Commonly known as:  GLUCOPHAGE-XR   Used for:  Type 2 diabetes mellitus without complication, with long-term current use of insulin (H), Morbid obesity, unspecified obesity type (H)   Started by:  Wegener, Joel Daniel Irwin, MD        Dose:  1000 mg   Take 2 tablets (1,000 mg) by mouth 2 times daily   Quantity:  120 tablet   Refills:  11         These medicines have changed or have updated prescriptions.        Dose/Directions    insulin glargine 100 UNIT/ML injection   Commonly known as:  LANTUS VIAL   This may have changed:  additional instructions   Used for:  Type 2 diabetes mellitus without complication, with long-term current use of insulin (H)   Changed by:  Wegener, Joel Daniel Irwin, MD        Dose:  45 Units   Inject 45 Units Subcutaneous At Bedtime   Quantity:  45 mL   Refills:  11            Where to get your medicines      These medications were sent to [facility] [place], [place] - [address]  [address], [facility] [address]     Phone:  [phone]     insulin  "glargine 100 UNIT/ML injection    insulin syringe-needle U-100 31G X 5/16\" 0.5 ML    metFORMIN 500 MG 24 hr tablet                Primary Care Provider Office Phone # Fax #    Melva Johnson [phone] [address]  [facility] [address]        Equal Access to Services     [facility] : Hadii aad ku hadasho Soomaali, waaxda luqadaha, qaybta kaalmada adeegyada, waxay idiin hayaan adeeg khurszulash la'aan . So [facility] [phone].    ATENCIÓN: Si habla español, tiene a ferrari disposición servicios gratuitos de asistencia lingüística. Llame al [phone].    We comply with applicable federal civil rights laws and [place] laws. We do not discriminate on the basis of race, color, national origin, age, disability, sex, sexual orientation, or gender identity.            Thank you!     Thank you for choosing [facility]  for your care. Our goal is always to provide you with excellent care. Hearing back from our patients is one way we can continue to improve our services. Please take a few minutes to complete the written survey that you may receive in the mail after your visit with us. Thank you!             Your Updated Medication List - Protect others around you: Learn how to safely use, store and throw away your medicines at www.disposemymeds.org.          This list is accurate as of: 1/22/18 12:31 PM.  Always use your most recent med list.                   Brand Name Dispense Instructions for use Diagnosis    ACE NOT PRESCRIBED (INTENTIONAL)      not prescribed        aspirin 81 MG tablet      Take 1 tablet by mouth daily.        Fiber Chew      2 daily        insulin glargine 100 UNIT/ML injection    LANTUS VIAL    45 mL    Inject 45 Units Subcutaneous At Bedtime    Type 2 diabetes mellitus without complication, with long-term current use of insulin (H)       * insulin syringe-needle U-100 30G X 1/2\" 0.5 ML    BD insulin syringe ULTRAFINE    100 each    Use one syringe twice " "daily or as directed.    Type 2 diabetes mellitus without complication (H)       * insulin syringe-needle U-100 31G X 5/16\" 0.5 ML    B-D INSULIN SYRINGE    30 each    USE ONE SYRINGE ONCE DAILY OR AS DIRECTED    Type 2 diabetes mellitus without complication, with long-term current use of insulin (H)       metFORMIN 500 MG 24 hr tablet    GLUCOPHAGE-XR    120 tablet    Take 2 tablets (1,000 mg) by mouth 2 times daily    Type 2 diabetes mellitus without complication, with long-term current use of insulin (H), Morbid obesity, unspecified obesity type (H)       MULTIVITAL Chew      Chew 1 tablet daily        STATIN NOT PRESCRIBED (INTENTIONAL)     0 each    Statin not prescribed intentionally due to Other patient declines (This option does not exclude patient from measure)        TUMS 500 MG chewable tablet   Generic drug:  calcium carbonate     90    1 TABLET 3 TIMES DAILY        * Notice:  This list has 2 medication(s) that are the same as other medications prescribed for you. Read the directions carefully, and ask your doctor or other care provider to review them with you.      "

## 2018-01-22 NOTE — PROGRESS NOTES
"  SUBJECTIVE:   Evette Herzog is a 62 year old female who presents to clinic today for the following health issues:      Diabetes Follow-up    Patient is checking blood sugars: once daily.  Results are as follows: Within normal range        Diabetic concerns: None     Symptoms of hypoglycemia (low blood sugar): none     Paresthesias (numbness or burning in feet) or sores: No     Date of last diabetic eye exam: This pass fall  BP Readings from Last 2 Encounters:   01/03/17 142/90   12/14/15 124/82     Hemoglobin A1C (%)   Date Value   01/03/2017 5.7   12/14/2015 6.2 (H)     LDL Cholesterol Calculated (mg/dL)   Date Value   01/03/2017 107 (H)   12/14/2015 115 (H)         Amount of exercise or physical activity: 1 day/week for an average of 30-45 minutes    Problems taking medications regularly: No    Medication side effects: none    Diet: regular (no restrictions)      Additional history/life update:       Type 2 diabetes mellitus without complication, with long-term current use of insulin (H): no health insurance which has led to cost saving decisions for her and has foregone preventive cares (mammo, pap), and comprehensive diabetes treatment and monitoring (frequent visits, statin, ace, etc.)  Is looking forward to having insurance though in one year and to start to address those issues.   Morbid obesity, unspecified obesity type (H): she understands is contributing, has worked very hard at staying active though and trying to watch diet and eat healthy.  Sometimes overdoes carbs but no pop, little junk food, little fast food.  Walks 4-5 times a week and \"pushes it\" to get up a sweat. Does some indoor walking in winter but not quite as much. Interested in trial of \"new metformin\" though for weight loss/blood sugars.  Had gi side effects in past from short acting metformin.           Problem list, Medication list, Allergies, and Medical/Social/Surgical histories reviewed in EPIC and updated as appropriate.  Labs " "reviewed in EPIC  BP Readings from Last 3 Encounters:   01/22/18 144/79   01/03/17 142/90   12/14/15 124/82    Wt Readings from Last 3 Encounters:   01/22/18 291 lb (132 kg)   01/03/17 277 lb (125.6 kg)   12/14/15 296 lb 8 oz (134.5 kg)                  Patient Active Problem List   Diagnosis     Insomnia     Oral lichen planus     Hyperlipidemia with target LDL less than 100     Type 2 diabetes mellitus without complication (H)     Morbid obesity, unspecified obesity type (H)     History reviewed. No pertinent surgical history.    Social History   Substance Use Topics     Smoking status: Former Smoker     Types: Cigarettes     Smokeless tobacco: Never Used      Comment: very occasionally     Alcohol use Yes      Comment: 4  beers daily     Family History   Problem Relation Age of Onset     Adopted: Yes         Current Outpatient Prescriptions   Medication Sig Dispense Refill     metFORMIN (GLUCOPHAGE-XR) 500 MG 24 hr tablet Take 2 tablets (1,000 mg) by mouth 2 times daily 120 tablet 11     insulin glargine (LANTUS VIAL) 100 UNIT/ML injection Inject 45 Units Subcutaneous At Bedtime 45 mL 11     insulin syringe-needle U-100 (B-D INSULIN SYRINGE) 31G X 5/16\" 0.5 ML USE ONE SYRINGE ONCE DAILY OR AS DIRECTED 30 each 11     insulin syringe-needle U-100 (BD INSULIN SYRINGE ULTRAFINE) 30G X 1/2\" 0.5 ML Use one syringe twice daily or as directed. 100 each 0     STATIN NOT PRESCRIBED, INTENTIONAL, Statin not prescribed intentionally due to Other patient declines (This option does not exclude patient from measure) 0 each 0     aspirin 81 MG tablet Take 1 tablet by mouth daily.  3     ACE NOT PRESCRIBED (INTENTIONAL) not prescribed  0     FIBER PO CHEW 2 daily       MULTIVITAL OR CHEW Chew 1 tablet daily       TUMS 500 MG OR CHEW 1 TABLET 3 TIMES DAILY 90 0     No Known Allergies  Recent Labs   Lab Test  01/22/18   1142  01/03/17   1120  12/14/15   0916  01/14/15   1122   01/31/14   1112   A1C  6.0  5.7  6.2*  7.0*   < >  6.8* "   LDL  91  107*  115*  98   --   123   HDL  54  42*  51  38*   --   50   TRIG  107  168*  168*  173*   --   186*   ALT  24   --    --   23   --   59*   CR  0.73  0.74  0.71  0.56   --   0.63   GFRESTIMATED  80  80  84  >90  Non  GFR Calc     --   >90   GFRESTBLACK  >90  >90  African American GFR Calc    >90   GFR Calc    >90   GFR Calc     --   >90   POTASSIUM  4.3   --   4.2  4.0   --   4.1   TSH  2.43   --   6.66*   --    --   4.75    < > = values in this interval not displayed.        ROS:  Constitutional, HEENT, cardiovascular, pulmonary, GI, , musculoskeletal, neuro, skin, endocrine and psych systems are negative, except as otherwise noted.        OBJECTIVE:  /79  Pulse 83  Temp 97.8  F (36.6  C) (Oral)  Resp 16  Wt 291 lb (132 kg)  SpO2 93%  BMI 44.9 kg/m2    EXAM:  GENERAL APPEARANCE: healthy, alert and no distress  RESP: lungs clear to auscultation - no rales, rhonchi or wheezes  CV: regular rates and rhythm, normal S1 S2, no S3 or S4 and no murmur, click or rub -  Bilateral diabetic monofilament foot exam normal   ASSESSMENT AND PLAN  Patient Instructions   ASSESSMENT AND PLAN  1. Type 2 diabetes mellitus without complication, with long-term current use of insulin (H)  a1c at goal.      No health insurance until one year from now which limits options.     Plan:    Desires a trial of extended release metformin again for diabetes/weight loss.  Didn't tolerate short acting before.     - start metformin xr 500mg daily for 2-3 weeks then increase to twice daily for 2-3 weeks then increase to two pills twice daily.     Refilled lantus at 45 units daily.  If getting too low (less than 70 fasting) then decrease by 5 units and call me or send [employer] message.     Reviewed blood sugar goals, 80, 120, 160.     Will update plans ater other labs return.     Did driving form and will fax.       - Comprehensive metabolic panel  - Lipid panel reflex to direct  "LDL Fasting  - Hemoglobin A1c  - TSH with free T4 reflex  - Albumin Random Urine Quantitative with Creat Ratio  - metFORMIN (GLUCOPHAGE-XR) 500 MG 24 hr tablet; Take 2 tablets (1,000 mg) by mouth 2 times daily  Dispense: 120 tablet; Refill: 11  - insulin glargine (LANTUS VIAL) 100 UNIT/ML injection; Inject 45 Units Subcutaneous At Bedtime  Dispense: 45 mL; Refill: 11  - insulin syringe-needle U-100 (B-D INSULIN SYRINGE) 31G X 5/16\" 0.5 ML; USE ONE SYRINGE ONCE DAILY OR AS DIRECTED  Dispense: 30 each; Refill: 11    2. Morbid obesity, unspecified obesity type (H)    - Comprehensive metabolic panel  - metFORMIN (GLUCOPHAGE-XR) 500 MG 24 hr tablet; Take 2 tablets (1,000 mg) by mouth 2 times daily  Dispense: 120 tablet; Refill: 11    3. Encounter for HCV screening test for low risk patient    - Hepatitis C Screen Reflex to HCV RNA Quant and Genotype - we will cancel this lab for cost saving as she is low risk (no h/o transfusions or IVDuse. )    Reviewed low cost care options including telephone, evisit and oncare.     Joel Wegener,MD        "

## 2018-01-23 LAB
ALBUMIN SERPL-MCNC: 2.9 G/DL (ref 3.4–5)
ALP SERPL-CCNC: 68 U/L (ref 40–150)
ALT SERPL W P-5'-P-CCNC: 24 U/L (ref 0–50)
ANION GAP SERPL CALCULATED.3IONS-SCNC: 8 MMOL/L (ref 3–14)
AST SERPL W P-5'-P-CCNC: 28 U/L (ref 0–45)
BILIRUB SERPL-MCNC: 0.6 MG/DL (ref 0.2–1.3)
BUN SERPL-MCNC: 18 MG/DL (ref 7–30)
CALCIUM SERPL-MCNC: 8.3 MG/DL (ref 8.5–10.1)
CHLORIDE SERPL-SCNC: 110 MMOL/L (ref 94–109)
CHOLEST SERPL-MCNC: 166 MG/DL
CO2 SERPL-SCNC: 23 MMOL/L (ref 20–32)
CREAT SERPL-MCNC: 0.73 MG/DL (ref 0.52–1.04)
CREAT UR-MCNC: 5 MG/DL
GFR SERPL CREATININE-BSD FRML MDRD: 80 ML/MIN/1.7M2
GLUCOSE SERPL-MCNC: 139 MG/DL (ref 70–99)
HCV AB SERPL QL IA: NONREACTIVE
HDLC SERPL-MCNC: 54 MG/DL
LDLC SERPL CALC-MCNC: 91 MG/DL
MICROALBUMIN UR-MCNC: 13 MG/L
MICROALBUMIN/CREAT UR: 277.19 MG/G CR (ref 0–25)
NONHDLC SERPL-MCNC: 112 MG/DL
POTASSIUM SERPL-SCNC: 4.3 MMOL/L (ref 3.4–5.3)
PROT SERPL-MCNC: 6.4 G/DL (ref 6.8–8.8)
SODIUM SERPL-SCNC: 141 MMOL/L (ref 133–144)
TRIGL SERPL-MCNC: 107 MG/DL
TSH SERPL DL<=0.005 MIU/L-ACNC: 2.43 MU/L (ref 0.4–4)

## 2018-08-07 ENCOUNTER — APPOINTMENT (OUTPATIENT)
Dept: GENERAL RADIOLOGY | Facility: CLINIC | Age: 63
End: 2018-08-07
Attending: EMERGENCY MEDICINE

## 2018-08-07 ENCOUNTER — HOSPITAL ENCOUNTER (EMERGENCY)
Facility: CLINIC | Age: 63
Discharge: HOME | End: 2018-08-07
Attending: EMERGENCY MEDICINE | Admitting: EMERGENCY MEDICINE

## 2018-08-07 VITALS
WEIGHT: 280 LBS | SYSTOLIC BLOOD PRESSURE: 163 MMHG | OXYGEN SATURATION: 98 % | DIASTOLIC BLOOD PRESSURE: 80 MMHG | HEART RATE: 85 BPM | TEMPERATURE: 98 F | BODY MASS INDEX: 43.95 KG/M2 | HEIGHT: 67 IN | RESPIRATION RATE: 20 BRPM

## 2018-08-07 DIAGNOSIS — S83.411A SPRAIN OF MEDIAL COLLATERAL LIGAMENT OF RIGHT KNEE, INITIAL ENCOUNTER: ICD-10-CM

## 2018-08-07 LAB — GLUCOSE BLDC GLUCOMTR-MCNC: 244 MG/DL (ref 70–99)

## 2018-08-07 PROCEDURE — 99284 EMERGENCY DEPT VISIT MOD MDM: CPT | Mod: 25

## 2018-08-07 PROCEDURE — 00000146 ZZHCL STATISTIC GLUCOSE BY METER IP

## 2018-08-07 PROCEDURE — 29505 APPLICATION LONG LEG SPLINT: CPT | Mod: RT

## 2018-08-07 PROCEDURE — 73562 X-RAY EXAM OF KNEE 3: CPT | Mod: RT

## 2018-08-07 ASSESSMENT — ENCOUNTER SYMPTOMS
JOINT SWELLING: 1
ARTHRALGIAS: 1

## 2018-08-07 NOTE — ED AVS SNAPSHOT
Emergency Department    [address] [place] [id number]    Phone:  [phone]    Fax:  [phone]                                       Evette Herzog   MRN: [id number]    Department:   Emergency Department   Date of Visit:  8/7/2018           After Visit Summary Signature Page     I have received my discharge instructions, and my questions have been answered. I have discussed any challenges I see with this plan with the nurse or doctor.    ..........................................................................................................................................  Patient/Patient Representative Signature      ..........................................................................................................................................  Patient Representative Print Name and Relationship to Patient    ..................................................               ................................................  Date                                            Time    ..........................................................................................................................................  Reviewed by Signature/Title    ...................................................              ..............................................  Date                                                            Time

## 2018-08-07 NOTE — ED NOTES
"RN at bedside preparing pt for discharge and pt eating courtesy meal. Pt states she forgot to eat and was concerned her BG was low d/t feeling shaky. Blood sugar checked and it was 244. Pt states \"I may have just gotten nervous because of my knee.\"  "

## 2018-08-07 NOTE — DISCHARGE INSTRUCTIONS
Knee Sprain    A sprain is an injury to the ligaments or capsule that holds a joint together. There are no broken bones. Most sprains take 3 to 6 weeks to heal. If it a severe sprain where the ligament is completely torn, it can take months to recover.  Most knee sprains are treated with a splint, knee immobilizer brace, or elastic wrap for support. Severe sprains may require surgery.  Home care    Stay off the injured leg as much as possible until you can walk on it without pain. If you have a lot of pain with walking, crutches or a walker may be prescribed. (These can be rented or purchased at many pharmacies and surgical or orthopedic supply stores). Follow your healthcare provider's advice about when to begin putting weight on that leg.    Keep your leg elevated to reduce pain and swelling. When sleeping, place a pillow under the injured leg. When sitting, support the injured leg so it is level with your waist. This is very important during the first 48 hours.    Apply an ice pack over the injured area for 15 to 20 minutes every 3 to 6 hours. You should do this for the first 24 to 48 hours. You can make an ice pack by filling a plastic bag that seals at the top with ice cubes and then wrapping it with a thin towel. Continue to use ice packs for relief of pain and swelling as needed. As the ice melts, be careful to avoid getting your wrap, splint, or cast wet. After 48 hours, apply heat (warm shower or warm bath) for 15 to 20 minutes several times a day, or alternate ice and heat. You can place the ice pack directly over the splint. If you have to wear a hook-and-loop knee brace, you can open it to apply the ice pack, or heat, directly to the knee. Never put ice directly on the skin. Always wrap the ice in a towel or other type of cloth.    You may use over-the-counter pain medicine to control pain, unless another pain medicine was prescribed.If you have chronic liver or kidney disease or ever had a stomach  ulcer or GI bleeding, talk with your healthcare provider before using these medicines.    If you were given a splint, keep it completely dry at all times. Bathe with your splint out of the water, protected with 2 large plastic bags, rubber-banded at the top end. If a fiberglass splint gets wet, you can dry it with a hair dryer. If you have a hook-and-loop knee brace, you can remove this to bathe, unless told otherwise.  Follow-up care  Follow up with your doctor as advised. Any X-rays you had today don t show any broken bones, breaks, or fractures. Sometimes fractures don t show up on the first X-ray. Bruises and sprains can sometimes hurt as much as a fracture. These injuries can take time to heal completely. If your symptoms don t improve or they get worse, talk with your doctor. You may need a repeat X-ray. If X-rays were taken, you will be told of any new findings that may affect your care.  Call 911  Call 911 if you have:     Shortness of breath     Chest pain  When to seek medical advice  Call your healthcare provider right away if any of these occur:    The splint or knee immobilizer brace becomes wet or soft    The fiberglass cast or splint remains wet for more than 24 hours    Pain or swelling increases    The injured leg or toes become cold, blue, numb, or tingly  Date Last Reviewed: 11/20/2015 2000-2017 [employer]. [address] [place] [address], [place] [address]. All rights reserved. This information is not intended as a substitute for professional medical care. Always follow your healthcare professional's instructions.

## 2018-08-07 NOTE — ED PROVIDER NOTES
"  History     Chief Complaint:  Knee Pain     HPI   Evette Herzog is a 63 year old female who presents accompanied by her daughter for evaluation of knee pain. This morning shortly prior to arrival, the patient tripped and fell striking her right knee on the ground. She did not strike her head or lose consciousness in the fall. Since then, the patient has developed pain and swelling of the right knee. Her pain is worse with weight bearing, although she has been able to walk despite her pain. Due to this injury, the patient came into the ED for evaluation. The patient is not currently anticoagulated.     Allergies:  NKDA      Medications:    aspirin 81 MG tablet  FIBER PO CHEW  insulin glargine (LANTUS VIAL) 100 UNIT/ML injection  metFORMIN (GLUCOPHAGE-XR) 500 MG 24 hr tablet  MULTIVITAL OR CHEW  TUMS 500 MG OR CHEW    Past Medical History:    Diabetes mellitus, type II   Hyperlipidemia   Obesity     Past Surgical History:    Gyn surgery     Family History:    History reviewed. No pertinent family history.     Social History:  Tobacco use:    Former smoker   Alcohol use:    Positive   Marital status:    Single   Accompanied to ED by:  Daughter      Review of Systems   Musculoskeletal: Positive for arthralgias (right knee) and joint swelling (right knee).   Neurological: Negative for syncope.   All other systems reviewed and are negative.    Physical Exam   First Vitals:  BP: 163/80  Pulse: 111  Temp: 98  F (36.7  C)  Resp: 16  Height: 170.2 cm (5' 7\")  Weight: 127 kg (280 lb)  SpO2: 96 %      Physical Exam  Constitutional: The patient is oriented to person, place, and time.   HENT:   Head: Atraumatic  Right Ear: Normal  Left Ear: Normal  Nose: Nose normal.   Mouth/Throat: Oropharynx is clear and moist. No erythema or exudate.   Eyes: Conjunctivae and EOM are normal. Pupils are equal, round, and reactive to light. No discharge  Neck: Normal range of motion. Neck supple.   Cardiovascular: Normal rate, regular rhythm, " no murmur gallops or rubs. Intact distal pulses.    Pulmonary/Chest: CTA bilaterally. No wheezes rale or rhonchi.  Abdominal: Soft. Non tender.  No masses   Musculoskeletal: No edema. No bony deformity. Normal range of motion. Right leg: Bruising just below the patella. Mild tenderness along the medial joint line. Slight increased laxity with stress to the MCL. Stable to anterior and posterior drawer. Normal pulses.   Lymphadenopathy:     The patient has no cervical adenopathy.   Neurological: The patient is alert and oriented to person, place, and time. The patient has normal strength and normal reflexes. No cranial nerve deficit. Coordination normal.  Skin: Skin is warm and dry. No rash noted. The patient is not diaphoretic.   Psychiatric: The patient has a normal mood and affect.    Emergency Department Course     Imaging:  Radiographic findings were communicated with the patient and family who voiced understanding of the findings.    XR Knee, Right:  IMPRESSION: Unremarkable exam.  Per radiology.     Emergency Department Course:  Nursing notes and vitals reviewed.  1239: I performed an exam of the patient as documented above.     1306: I updated and reassessed the patient.     Findings and plan explained to the Patient and daughter. Patient discharged home with instructions regarding supportive care, medications, and reasons to return. The importance of close follow-up was reviewed.    Impression & Plan      Medical Decision Making:  Evette Herzog is a 63 year old female who presents after a slip and fall onto her right knee and is having trouble weight-bearing at this time. On exam, she does have bruising just below the knee but no focal bony tenderness. Mild pain with palpation along the medial joint line. Slightly lax with stressing of the medial collateral ligament but stable to drawer testing. X-ray does not show evidence of fracture. I feel that this is a simple knee sprain. She is safe for discharge  home. She was placed into a knee immobilizer and was offered crutches, which she declined. She can use Tylenol and Motrin for pain. Ice the knee. Follow up with [facility], and return for problems.      Diagnosis:    ICD-10-CM   1. Sprain of medial collateral ligament of right knee, initial encounter S83.411A       Disposition:  Discharged to home.       I, Mike Perez, am serving as a scribe at 12:39 PM on 8/7/2018 to document services personally performed by Dr. Green, based on my observations and the provider's statements to me.       EMERGENCY DEPARTMENT       Junior Green MD  08/07/18 [id number]

## 2018-08-07 NOTE — ED AVS SNAPSHOT
Emergency Department    [address] [facility] [address]    Phone:  [phone]    Fax:  [phone]                                       Evette Herzog   MRN: [id number]    Department:   Emergency Department   Date of Visit:  8/7/2018           Patient Information     Date Of Birth          1955        Your diagnoses for this visit were:     Sprain of medial collateral ligament of right knee, initial encounter        You were seen by Junior Green MD.      Follow-up Information     Follow up with Melva Johnson MD.    Specialty:  Family Practice    Contact information:    [address]  [facility] [address]  [phone]          Follow up with Sacha Sanchez MD.    Specialty:  Orthopedics    Contact information:    [facility]  [address]  [facility] [address]  [phone]          Discharge Instructions         Knee Sprain    A sprain is an injury to the ligaments or capsule that holds a joint together. There are no broken bones. Most sprains take 3 to 6 weeks to heal. If it a severe sprain where the ligament is completely torn, it can take months to recover.  Most knee sprains are treated with a splint, knee immobilizer brace, or elastic wrap for support. Severe sprains may require surgery.  Home care    Stay off the injured leg as much as possible until you can walk on it without pain. If you have a lot of pain with walking, crutches or a walker may be prescribed. (These can be rented or purchased at many pharmacies and surgical or orthopedic supply stores). Follow your healthcare provider's advice about when to begin putting weight on that leg.    Keep your leg elevated to reduce pain and swelling. When sleeping, place a pillow under the injured leg. When sitting, support the injured leg so it is level with your waist. This is very important during the first 48 hours.    Apply an ice pack over the injured area for 15 to 20 minutes every 3  to 6 hours. You should do this for the first 24 to 48 hours. You can make an ice pack by filling a plastic bag that seals at the top with ice cubes and then wrapping it with a thin towel. Continue to use ice packs for relief of pain and swelling as needed. As the ice melts, be careful to avoid getting your wrap, splint, or cast wet. After 48 hours, apply heat (warm shower or warm bath) for 15 to 20 minutes several times a day, or alternate ice and heat. You can place the ice pack directly over the splint. If you have to wear a hook-and-loop knee brace, you can open it to apply the ice pack, or heat, directly to the knee. Never put ice directly on the skin. Always wrap the ice in a towel or other type of cloth.    You may use over-the-counter pain medicine to control pain, unless another pain medicine was prescribed.If you have chronic liver or kidney disease or ever had a stomach ulcer or GI bleeding, talk with your healthcare provider before using these medicines.    If you were given a splint, keep it completely dry at all times. Bathe with your splint out of the water, protected with 2 large plastic bags, rubber-banded at the top end. If a fiberglass splint gets wet, you can dry it with a hair dryer. If you have a hook-and-loop knee brace, you can remove this to bathe, unless told otherwise.  Follow-up care  Follow up with your doctor as advised. Any X-rays you had today don t show any broken bones, breaks, or fractures. Sometimes fractures don t show up on the first X-ray. Bruises and sprains can sometimes hurt as much as a fracture. These injuries can take time to heal completely. If your symptoms don t improve or they get worse, talk with your doctor. You may need a repeat X-ray. If X-rays were taken, you will be told of any new findings that may affect your care.  Call 911  Call 911 if you have:     Shortness of breath     Chest pain  When to seek medical advice  Call your healthcare provider right away if  "any of these occur:    The splint or knee immobilizer brace becomes wet or soft    The fiberglass cast or splint remains wet for more than 24 hours    Pain or swelling increases    The injured leg or toes become cold, blue, numb, or tingly  Date Last Reviewed: 11/20/2015 2000-2017 [employer]. [address] [place] [address]. All rights reserved. This information is not intended as a substitute for professional medical care. Always follow your healthcare professional's instructions.          24 Hour Appointment Hotline       To make an appointment at any [facility], call [phone] ([phone]). If you don't have a family doctor or clinic, we will help you find one. [facility] clinics are conveniently located to serve the needs of you and your family.             Review of your medicines      Our records show that you are taking the medicines listed below. If these are incorrect, please call your family doctor or clinic.        Dose / Directions Last dose taken    ACE NOT PRESCRIBED (INTENTIONAL)        not prescribed   Refills:  0        aspirin 81 MG tablet   Dose:  1 tablet        Take 1 tablet by mouth daily.   Refills:  3        Fiber Chew        2 daily   Refills:  0        insulin glargine 100 UNIT/ML injection   Commonly known as:  LANTUS VIAL   Dose:  45 Units   Quantity:  45 mL        Inject 45 Units Subcutaneous At Bedtime   Refills:  11        * insulin syringe-needle U-100 30G X 1/2\" 0.5 ML   Commonly known as:  BD insulin syringe ULTRAFINE   Quantity:  100 each        Use one syringe twice daily or as directed.   Refills:  0        * insulin syringe-needle U-100 31G X 5/16\" 0.5 ML   Commonly known as:  B-D INSULIN SYRINGE   Quantity:  30 each        USE ONE SYRINGE ONCE DAILY OR AS DIRECTED   Refills:  11        metFORMIN 500 MG 24 hr tablet   Commonly known as:  GLUCOPHAGE-XR   Dose:  1000 mg   Quantity:  120 tablet        Take 2 tablets (1,000 mg) by mouth 2 " times daily   Refills:  11        MULTIVITAL Chew        Chew 1 tablet daily   Refills:  0        STATIN NOT PRESCRIBED (INTENTIONAL)   Quantity:  0 each        Statin not prescribed intentionally due to Other patient declines (This option does not exclude patient from measure)   Refills:  0        TUMS 500 MG chewable tablet   Quantity:  90   Generic drug:  calcium carbonate        1 TABLET 3 TIMES DAILY   Refills:  0        * Notice:  This list has 2 medication(s) that are the same as other medications prescribed for you. Read the directions carefully, and ask your doctor or other care provider to review them with you.            Procedures and tests performed during your visit     XR Knee Right 3 Views      Orders Needing Specimen Collection     None      Pending Results     No orders found from 8/5/2018 to 8/8/2018.            Pending Culture Results     No orders found from 8/5/2018 to 8/8/2018.            Pending Results Instructions     If you had any lab results that were not finalized at the time of your Discharge, you can call the ED Lab Result RN at [phone]. You will be contacted by this team for any positive Lab results or changes in treatment. The nurses are available 7 days a week from 10A to 6:30P.  You can leave a message 24 hours per day and they will return your call.        Test Results From Your Hospital Stay        8/7/2018  1:04 PM      Narrative     XR KNEE RT 3 VW   8/7/2018 12:57 PM     HISTORY:  fall onto knee;         Impression     IMPRESSION: Unremarkable exam.    EVA JOHNSTON MD                Clinical Quality Measure: Blood Pressure Screening     Your blood pressure was checked while you were in the emergency department today. The last reading we obtained was  BP: 163/80 . Please read the guidelines below about what these numbers mean and what you should do about them.  If your systolic blood pressure (the top number) is less than 120 and your diastolic blood pressure (the bottom  number) is less than 80, then your blood pressure is normal. There is nothing more that you need to do about it.  If your systolic blood pressure (the top number) is 120-139 or your diastolic blood pressure (the bottom number) is 80-89, your blood pressure may be higher than it should be. You should have your blood pressure rechecked within a year by a primary care provider.  If your systolic blood pressure (the top number) is 140 or greater or your diastolic blood pressure (the bottom number) is 90 or greater, you may have high blood pressure. High blood pressure is treatable, but if left untreated over time it can put you at risk for heart attack, stroke, or kidney failure. You should have your blood pressure rechecked by a primary care provider within the next 4 weeks.  If your provider in the emergency department today gave you specific instructions to follow-up with your doctor or provider even sooner than that, you should follow that instruction and not wait for up to 4 weeks for your follow-up visit.        Thank you for choosing [facility]       Thank you for choosing [facility] for your care. Our goal is always to provide you with excellent care. Hearing back from our patients is one way we can continue to improve our services. Please take a few minutes to complete the written survey that you may receive in the mail after you visit with us. Thank you!        [employer] Information     [employer] gives you secure access to your electronic health record. If you see a primary care provider, you can also send messages to your care team and make appointments. If you have questions, please call your primary care clinic.  If you do not have a primary care provider, please call [phone] and they will assist you.        Care EveryWhere ID     This is your Care EveryWhere ID. This could be used by other organizations to access your [facility] medical records  [id number]        Equal Access to Services     JASON GONZALES AH:  Hadii any Ramon, watoryda bryant, qaangelicta kaalmelisa antonio. So [facility] [phone].    ATENCIÓN: Si habla español, tiene a ferrari disposición servicios gratuitos de asistencia lingüística. Llame al [phone].    We comply with applicable federal civil rights laws and [place] laws. We do not discriminate on the basis of race, color, national origin, age, disability, sex, sexual orientation, or gender identity.            After Visit Summary       This is your record. Keep this with you and show to your community pharmacist(s) and doctor(s) at your next visit.

## 2018-08-08 ENCOUNTER — PATIENT OUTREACH (OUTPATIENT)
Dept: CARE COORDINATION | Facility: CLINIC | Age: 63
End: 2018-08-08

## 2018-08-08 DIAGNOSIS — S83.144A: Primary | ICD-10-CM

## 2018-08-08 NOTE — PROGRESS NOTES
Clinic Care Coordination Contact  [facility]/Voicemail    Referral Source: ED Follow-Up  ED visit 8/7/2018---Slipped and fell striking her right knee to the ground  X-ray does not show a fracture  .   Recommendation to follow up with [facility]   Clinical Data: Care Coordinator Outreach  Outreach attempted x 1.  Left message on voicemail with call back information and requested return call.  Plan: . Care Coordinator will try to reach patient again in 1-2 business days.  Danielle Salazar RN / Clinical Care Coordinator     [facility]    [facility]   [email or web address]  Office :  [phone] / Fax :  [phone]

## 2018-08-09 NOTE — PROGRESS NOTES
Clinic Care Coordination Contact  [facility]/Voicemail    Referral Source: ED Follow-Up  Clinical Data: Care Coordinator Outreach  Outreach attempted x 2.  Left message on voicemail with call back information and requested return call.  Plan: Care Coordinator will await a return call  Danielle Salazar RN / Clinical Care Coordinator     [facility]   [email or web address]  Office :  [phone] / Fax :  [phone]

## 2018-08-14 NOTE — PROGRESS NOTES
No return call from the patient .  [facility] letter mailed     Danielle Salazar RN / Clinical Care Coordinator     [facility][note 3]   [email or web address]  Office :  [phone] / Fax :  [phone]

## 2018-10-12 ENCOUNTER — TELEPHONE (OUTPATIENT)
Dept: FAMILY MEDICINE | Facility: CLINIC | Age: 63
End: 2018-10-12

## 2018-10-12 NOTE — TELEPHONE ENCOUNTER
Patient does not look at her my chart messages to I mailed letter letting her know she is due for a diabetes appointment

## 2018-11-15 ENCOUNTER — TELEPHONE (OUTPATIENT)
Dept: FAMILY MEDICINE | Facility: CLINIC | Age: 63
End: 2018-11-15

## 2019-05-21 ENCOUNTER — OFFICE VISIT (OUTPATIENT)
Dept: FAMILY MEDICINE | Facility: CLINIC | Age: 64
End: 2019-05-21

## 2019-05-21 VITALS
RESPIRATION RATE: 18 BRPM | BODY MASS INDEX: 40.34 KG/M2 | WEIGHT: 257 LBS | HEART RATE: 84 BPM | DIASTOLIC BLOOD PRESSURE: 83 MMHG | HEIGHT: 67 IN | SYSTOLIC BLOOD PRESSURE: 138 MMHG | TEMPERATURE: 98.5 F | OXYGEN SATURATION: 96 %

## 2019-05-21 DIAGNOSIS — E66.01 MORBID OBESITY, UNSPECIFIED OBESITY TYPE (H): ICD-10-CM

## 2019-05-21 DIAGNOSIS — E11.9 TYPE 2 DIABETES MELLITUS WITHOUT COMPLICATION, WITH LONG-TERM CURRENT USE OF INSULIN (H): Primary | ICD-10-CM

## 2019-05-21 DIAGNOSIS — Z79.4 TYPE 2 DIABETES MELLITUS WITHOUT COMPLICATION, WITH LONG-TERM CURRENT USE OF INSULIN (H): Primary | ICD-10-CM

## 2019-05-21 DIAGNOSIS — E78.5 HYPERLIPIDEMIA WITH TARGET LDL LESS THAN 100: ICD-10-CM

## 2019-05-21 LAB
CREAT UR-MCNC: 37 MG/DL
HBA1C MFR BLD: 5.3 % (ref 0–5.6)
MICROALBUMIN UR-MCNC: 7 MG/L
MICROALBUMIN/CREAT UR: 19.56 MG/G CR (ref 0–25)

## 2019-05-21 PROCEDURE — 82043 UR ALBUMIN QUANTITATIVE: CPT | Performed by: FAMILY MEDICINE

## 2019-05-21 PROCEDURE — 99207 C FOOT EXAM  NO CHARGE: CPT | Performed by: FAMILY MEDICINE

## 2019-05-21 PROCEDURE — 80061 LIPID PANEL: CPT | Performed by: FAMILY MEDICINE

## 2019-05-21 PROCEDURE — 36415 COLL VENOUS BLD VENIPUNCTURE: CPT | Performed by: FAMILY MEDICINE

## 2019-05-21 PROCEDURE — 99214 OFFICE O/P EST MOD 30 MIN: CPT | Performed by: FAMILY MEDICINE

## 2019-05-21 PROCEDURE — 80053 COMPREHEN METABOLIC PANEL: CPT | Performed by: FAMILY MEDICINE

## 2019-05-21 PROCEDURE — 84443 ASSAY THYROID STIM HORMONE: CPT | Performed by: FAMILY MEDICINE

## 2019-05-21 PROCEDURE — 83036 HEMOGLOBIN GLYCOSYLATED A1C: CPT | Performed by: FAMILY MEDICINE

## 2019-05-21 RX ORDER — INSULIN GLARGINE 100 [IU]/ML
45 INJECTION, SOLUTION SUBCUTANEOUS AT BEDTIME
Qty: 45 ML | Refills: 3 | Status: SHIPPED | OUTPATIENT
Start: 2019-05-21 | End: 2020-05-29

## 2019-05-21 RX ORDER — SYRINGE-NEEDLE,INSULIN,0.5 ML 27GX1/2"
SYRINGE, EMPTY DISPOSABLE MISCELLANEOUS
Qty: 90 EACH | Refills: 3 | Status: SHIPPED | OUTPATIENT
Start: 2019-05-21 | End: 2020-06-05

## 2019-05-21 ASSESSMENT — MIFFLIN-ST. JEOR: SCORE: 1743.37

## 2019-05-21 NOTE — PATIENT INSTRUCTIONS
Follow up after has insurance for preventive physical.     No medication changes today.     Not using metformin .  Removed from list.     No ace inhibitor due to low blood pressure and also no kidney damage.     May consider statin after has insurance.

## 2019-05-21 NOTE — PROGRESS NOTES
"Subjective     Evette Herzog is a 64 year old female who presents to clinic today for the following health issues:    HPI   Diabetes Follow-up      How often are you checking your blood sugar? One time daily    What time of day are you checking your blood sugars (select all that apply)?  After meals    Have you had any blood sugars above 200?  No    Have you had any blood sugars below 70?  No    What symptoms do you notice when your blood sugar is low?  None    What concerns do you have today about your diabetes? None     Do you have any of these symptoms? (Select all that apply)  Weight loss     Have you had a diabetic eye exam in the last 12 months? Yes- Date of last eye exam: 2018    BP Readings from Last 2 Encounters:   05/21/19 138/83   08/07/18 163/80     Hemoglobin A1C (%)   Date Value   05/21/2019 5.3   01/22/2018 6.0     LDL Cholesterol Calculated (mg/dL)   Date Value   01/22/2018 91   01/03/2017 107 (H)       Diabetes Management Resources    Amount of exercise or physical activity: walking    Problems taking medications regularly: No    Medication side effects: none    Diet: regular (no restrictions)         Type 2 diabetes mellitus without complication, with long-term current use of insulin (H)  Morbid obesity, unspecified obesity type (H)  Hyperlipidemia with target LDL less than 100 :still without insurance but will be getting insurance in a few months now that she is 64.  So still keeping costs low.  Deferring other preventive care until then but figured she better get a diabetes check. Does check sugars some every few days, \"mostly 150s\"  .     No paresthesias, chest pain, dyspnea, polyuria, vision changes. Trying to get exercise walking dogs.         Problem list, Medication list, Allergies, and Medical/Social/Surgical histories reviewed in [facility] and updated as appropriate.  Labs reviewed in EPIC  BP Readings from Last 3 Encounters:   05/21/19 138/83   08/07/18 163/80   01/22/18 144/79    Wt Readings " "from Last 3 Encounters:   05/21/19 116.6 kg (257 lb)   08/07/18 127 kg (280 lb)   01/22/18 132 kg (291 lb)                  Patient Active Problem List   Diagnosis     Insomnia     Oral lichen planus     Hyperlipidemia with target LDL less than 100     Type 2 diabetes mellitus without complication (H)     Morbid obesity, unspecified obesity type (H)     Past Surgical History:   Procedure Laterality Date     GYN SURGERY         Social History     Tobacco Use     Smoking status: Former Smoker     Types: Cigarettes     Smokeless tobacco: Never Used     Tobacco comment: very occasionally   Substance Use Topics     Alcohol use: Yes     Comment: 4  beers daily     Family History   Adopted: Yes         Current Outpatient Medications   Medication Sig Dispense Refill     ACE NOT PRESCRIBED (INTENTIONAL) not prescribed  0     aspirin 81 MG tablet Take 1 tablet by mouth daily.  3     FIBER PO CHEW 2 daily       insulin glargine (LANTUS VIAL) 100 UNIT/ML vial Inject 45 Units Subcutaneous At Bedtime 45 mL 3     insulin syringe-needle U-100 (B-D INSULIN SYRINGE) 31G X 5/16\" 0.5 ML miscellaneous USE ONE SYRINGE ONCE DAILY OR AS DIRECTED 90 each 3     MULTIVITAL OR CHEW Chew 1 tablet daily       STATIN NOT PRESCRIBED, INTENTIONAL, Statin not prescribed intentionally due to Other patient declines (This option does not exclude patient from measure) 0 each 0     TUMS 500 MG OR CHEW 1 TABLET 3 TIMES DAILY 90 0     No Known Allergies  Recent Labs   Lab Test 05/21/19  1422 01/22/18  1142 01/03/17  1120  01/14/15  1122   A1C 5.3 6.0 5.7   < > 7.0*   * 91 107*   < > 98   HDL 68 54 42*   < > 38*   TRIG 98 107 168*   < > 173*   ALT 27 24  --   --  23   CR 0.72 0.73 0.74   < > 0.56   GFRESTIMATED 87 80 80   < > >90  Non  GFR Calc     GFRESTBLACK >90 >90 >90  African American GFR Calc     < > >90   GFR Calc     POTASSIUM 4.1 4.3  --    < > 4.0   TSH 2.33 2.43  --    < >  --     < > = values in this " "interval not displayed.        ROS:  Constitutional, HEENT, cardiovascular, pulmonary, GI, , musculoskeletal, neuro, skin, endocrine and psych systems are negative, except as otherwise noted.        OBJECTIVE:  /83 (BP Location: Left arm, Patient Position: Sitting, Cuff Size: Adult Regular)   Pulse 84   Temp 98.5  F (36.9  C) (Oral)   Resp 18   Ht 1.702 m (5' 7\")   Wt 116.6 kg (257 lb)   SpO2 96%   BMI 40.25 kg/m      EXAM:  GENERAL APPEARANCE: healthy, alert and no distress  RESP: lungs clear to auscultation - no rales, rhonchi or wheezes  CV: regular rates and rhythm, normal S1 S2, no S3 or S4 and no murmur, click or rub -  Bilateral diabetic monofilament foot exam normal     ASSESSMENT AND PLAN  Patient Instructions   Follow up after has insurance for preventive physical.     No medication changes today.     Not using metformin .  Removed from list.     No ace inhibitor due to low blood pressure and also no kidney damage.     May consider statin after has insurance.           Billing diagnoses/labs:  ASSESSMENT AND PLAN  1. Type 2 diabetes mellitus without complication, with long-term current use of insulin (H)    - Albumin Random Urine Quantitative with Creat Ratio; Future  - Comprehensive metabolic panel; Future  - TSH with free T4 reflex; Future  - Hemoglobin A1c; Future  - Hemoglobin A1c  - TSH with free T4 reflex  - Comprehensive metabolic panel  - Albumin Random Urine Quantitative with Creat Ratio  - insulin glargine (LANTUS VIAL) 100 UNIT/ML vial; Inject 45 Units Subcutaneous At Bedtime  Dispense: 45 mL; Refill: 3  - insulin syringe-needle U-100 (B-D INSULIN SYRINGE) 31G X 5/16\" 0.5 ML miscellaneous; USE ONE SYRINGE ONCE DAILY OR AS DIRECTED  Dispense: 90 each; Refill: 3    2. Morbid obesity, unspecified obesity type (H)      3. Hyperlipidemia with target LDL less than 100    - Lipid panel reflex to direct LDL Fasting; Future  - Lipid panel reflex to direct LDL Fasting        MYCHART FOR " "ON-LINE CARE(VISITS), LABS, REFILLS, MESSAGING, ETC [email or web address] , [phone]    E-VISIT: click \"on-line care, then request e-visit\".  E-visits work well for following up on issues we have discussed in clinic previously which may need new prescriptions, new prescriptions or substantial discussion. These are always done by me (Dr. Wegener).     ONCARE VISIT:  [email or web address]  - we treat nearly 50 common conditions through on-care.  These are done in an hour by on-call staff.     RADIOLOGY:  [facility]:  [phone]   [facility]: [phone]    Mammogram and Colonoscopy [phone]    Smoking Cessation: [email or web address], [phone] ([phone])      CONSUMER PRICE LINE for estimates of test costs:  [phone]         Joel Wegener, MD        "

## 2019-05-22 LAB
ALBUMIN SERPL-MCNC: 3.4 G/DL (ref 3.4–5)
ALP SERPL-CCNC: 75 U/L (ref 40–150)
ALT SERPL W P-5'-P-CCNC: 27 U/L (ref 0–50)
ANION GAP SERPL CALCULATED.3IONS-SCNC: 9 MMOL/L (ref 3–14)
AST SERPL W P-5'-P-CCNC: 27 U/L (ref 0–45)
BILIRUB SERPL-MCNC: 0.7 MG/DL (ref 0.2–1.3)
BUN SERPL-MCNC: 11 MG/DL (ref 7–30)
CALCIUM SERPL-MCNC: 9.1 MG/DL (ref 8.5–10.1)
CHLORIDE SERPL-SCNC: 109 MMOL/L (ref 94–109)
CHOLEST SERPL-MCNC: 193 MG/DL
CO2 SERPL-SCNC: 23 MMOL/L (ref 20–32)
CREAT SERPL-MCNC: 0.72 MG/DL (ref 0.52–1.04)
GFR SERPL CREATININE-BSD FRML MDRD: 87 ML/MIN/{1.73_M2}
GLUCOSE SERPL-MCNC: 63 MG/DL (ref 70–99)
HDLC SERPL-MCNC: 68 MG/DL
LDLC SERPL CALC-MCNC: 105 MG/DL
NONHDLC SERPL-MCNC: 125 MG/DL
POTASSIUM SERPL-SCNC: 4.1 MMOL/L (ref 3.4–5.3)
PROT SERPL-MCNC: 6.9 G/DL (ref 6.8–8.8)
SODIUM SERPL-SCNC: 141 MMOL/L (ref 133–144)
TRIGL SERPL-MCNC: 98 MG/DL
TSH SERPL DL<=0.005 MIU/L-ACNC: 2.33 MU/L (ref 0.4–4)

## 2019-10-01 ENCOUNTER — HEALTH MAINTENANCE LETTER (OUTPATIENT)
Age: 64
End: 2019-10-01

## 2019-12-15 ENCOUNTER — HEALTH MAINTENANCE LETTER (OUTPATIENT)
Age: 64
End: 2019-12-15

## 2020-05-26 DIAGNOSIS — Z79.4 TYPE 2 DIABETES MELLITUS WITHOUT COMPLICATION, WITH LONG-TERM CURRENT USE OF INSULIN (H): ICD-10-CM

## 2020-05-26 DIAGNOSIS — E11.9 TYPE 2 DIABETES MELLITUS WITHOUT COMPLICATION, WITH LONG-TERM CURRENT USE OF INSULIN (H): ICD-10-CM

## 2020-05-29 RX ORDER — INSULIN GLARGINE 100 [IU]/ML
INJECTION, SOLUTION SUBCUTANEOUS
Qty: 40 ML | Refills: 0 | Status: SHIPPED | OUTPATIENT
Start: 2020-05-29 | End: 2021-02-15

## 2020-05-29 NOTE — TELEPHONE ENCOUNTER
Routing refill request to provider for review/approval because:  Labs not current:  A1c, BMP  Patient needs to be seen because it has been more than 1 year since last office visit.

## 2020-06-05 ENCOUNTER — VIRTUAL VISIT (OUTPATIENT)
Dept: FAMILY MEDICINE | Facility: CLINIC | Age: 65
End: 2020-06-05
Payer: COMMERCIAL

## 2020-06-05 VITALS — SYSTOLIC BLOOD PRESSURE: 122 MMHG | DIASTOLIC BLOOD PRESSURE: 69 MMHG | HEART RATE: 74 BPM

## 2020-06-05 DIAGNOSIS — Z79.4 TYPE 2 DIABETES MELLITUS WITHOUT COMPLICATION, WITH LONG-TERM CURRENT USE OF INSULIN (H): ICD-10-CM

## 2020-06-05 DIAGNOSIS — E11.9 TYPE 2 DIABETES MELLITUS WITHOUT COMPLICATION, WITH LONG-TERM CURRENT USE OF INSULIN (H): ICD-10-CM

## 2020-06-05 PROCEDURE — 99214 OFFICE O/P EST MOD 30 MIN: CPT | Mod: 95 | Performed by: FAMILY MEDICINE

## 2020-06-05 RX ORDER — SYRINGE-NEEDLE,INSULIN,0.5 ML 27GX1/2"
SYRINGE, EMPTY DISPOSABLE MISCELLANEOUS
Qty: 90 EACH | Refills: 3 | Status: SHIPPED | OUTPATIENT
Start: 2020-06-05 | End: 2021-02-15

## 2020-06-05 NOTE — PROGRESS NOTES
"Evette Herzog is a 65 year old female who is being evaluated via a billable video visit.      The patient has been notified of following:     \"This video visit will be conducted via a call between you and your physician/provider. We have found that certain health care needs can be provided without the need for an in-person physical exam.  This service lets us provide the care you need with a video conversation.  If a prescription is necessary we can send it directly to your pharmacy.  If lab work is needed we can place an order for that and you can then stop by our lab to have the test done at a later time.    Video visits are billed at different rates depending on your insurance coverage.  Please reach out to your insurance provider with any questions.    If during the course of the call the physician/provider feels a video visit is not appropriate, you will not be charged for this service.\"    Patient has given verbal consent for Video visit? Yes    How would you like to obtain your AVS? Evangelina    Patient would like the video invitation sent by: Send to e-mail at: [email or web address]    Will anyone else be joining your video visit? No      Subjective     Evette Herzog is a 65 year old female who presents today via video visit for the following health issues:    HPI  Diabetes Follow-up    How often are you checking your blood sugar? One time daily  What time of day are you checking your blood sugars (select all that apply)?  Before meals  Have you had any blood sugars above 200?  No  Have you had any blood sugars below 70?  No    What symptoms do you notice when your blood sugar is low?  None    What concerns do you have today about your diabetes? None     Do you have any of these symptoms? (Select all that apply)  No numbness or tingling in feet.  No redness, sores or blisters on feet.  No complaints of excessive thirst.  No reports of blurry vision.  No significant changes to weight.    Have you had a " diabetic eye exam in the last 12 months? No        BP Readings from Last 2 Encounters:   05/21/19 138/83   08/07/18 163/80     Hemoglobin A1C (%)   Date Value   05/21/2019 5.3   01/22/2018 6.0     LDL Cholesterol Calculated (mg/dL)   Date Value   05/21/2019 105 (H)   01/22/2018 91                 How many servings of fruits and vegetables do you eat daily?  0-1    On average, how many sweetened beverages do you drink each day (Examples: soda, juice, sweet tea, etc.  Do NOT count diet or artificially sweetened beverages)?   0    How many days per week do you miss taking your medication? 0      Video Start Time: 9:08      Type 2 diabetes mellitus without complication, with long-term current use of insulin (H) :overalldoing quite well.  Has insurance now.  [place]ives [place].  Feeling safe though.      No numbness/tingling.  No hypoglycemia.      Checks blood sugars rarely since has been so good.     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         Problem list, Medication list, Allergies, and Medical/Social/Surgical histories reviewed in EPIC and updated as appropriate.Reviewed and updated as needed this visit by Provider  Labs reviewed in EPIC    Patient Active Problem List   Diagnosis     Insomnia     Oral lichen planus     Hyperlipidemia with target LDL less than 100     Type 2 diabetes mellitus without complication (H)     Morbid obesity, unspecified obesity type (H)     Past Surgical History:   Procedure Laterality Date     GYN SURGERY         Social History     Tobacco Use     Smoking status: Former Smoker     Types: Cigarettes     Smokeless tobacco: Never Used     Tobacco comment: very occasionally   Substance Use Topics     Alcohol use: Yes     Comment: 4  beers daily     Family History   Adopted: Yes           Current Outpatient Medications   Medication Sig Dispense Refill     ACE NOT PRESCRIBED (INTENTIONAL) not prescribed  0     FIBER PO CHEW 2 daily       insulin  "syringe-needle U-100 (B-D INSULIN SYRINGE) 31G X 5/16\" 0.5 ML miscellaneous USE ONE SYRINGE ONCE DAILY OR AS DIRECTED 90 each 3     LANTUS VIAL 100 UNIT/ML soln INJECT 45 UNITS UNDER THE SKIN AT BEDTIME 40 mL 0     MULTIVITAL OR CHEW Chew 1 tablet daily       STATIN NOT PRESCRIBED, INTENTIONAL, Statin not prescribed intentionally due to Other patient declines (This option does not exclude patient from measure) 0 each 0     TUMS 500 MG OR CHEW 1 TABLET 3 TIMES DAILY 90 0     No Known Allergies  Recent Labs   Lab Test 05/21/19  1422 01/22/18  1142 01/03/17  1120  01/14/15  1122   A1C 5.3 6.0 5.7   < > 7.0*   * 91 107*   < > 98   HDL 68 54 42*   < > 38*   TRIG 98 107 168*   < > 173*   ALT 27 24  --   --  23   CR 0.72 0.73 0.74   < > 0.56   GFRESTIMATED 87 80 80   < > >90  Non  GFR Calc     GFRESTBLACK >90 >90 >90  African American GFR Calc     < > >90   GFR Calc     POTASSIUM 4.1 4.3  --    < > 4.0   TSH 2.33 2.43  --    < >  --     < > = values in this interval not displayed.        BP Readings from Last 3 Encounters:   05/21/19 138/83   08/07/18 163/80   01/22/18 144/79    Wt Readings from Last 3 Encounters:   05/21/19 116.6 kg (257 lb)   08/07/18 127 kg (280 lb)   01/22/18 132 kg (291 lb)                      ROS:  Constitutional, HEENT, cardiovascular, pulmonary, GI, , musculoskeletal, neuro, skin, endocrine and psych systems are negative, except as otherwise noted.      Objective    There were no vitals taken for this visit.    Physical Exam     GENERAL: healthy, alert and no distress  Clear speech, appears well.  Full affect.   PSYCH: mentation appears normal, affect normal/bright, judgement and insight intact, normal speech and appearance well-groomed      Diagnostic Test Results:  Labs reviewed in Epic      ASSESSMENT AND PLAN  1. Type 2 diabetes mellitus without complication, with long-term current use of insulin (H)  Already refilled insulin/reviewed dose.  Ordered " "needles.     Eye exam as soon as able.     Definitely recommend labs now since quite overdue and likely more covid in clinics in future but right now clinics empty and safe.      A1c, lipids, cmp, albutmin.       Will get set up for cologuard.      Let her know we are scheduling mammograms and I encourage her to do that. Discussed concept of asymptomatic screening for high risk cancers such as breast and colon.     RADIOLOGY for mammo scheduling.   [facility]:  [phone]   [facility]: [phone]      Stop daily aspirin - no longer used for primary prevention, was not taking regularly anyway.     No ace due to low blood pressue/no proteinuria sometimes blood pressue as low as 80s/50.     No statin due to choice, re-consider depending on labs.    follow up 3-6 monts for physical.     - insulin syringe-needle U-100 (B-D INSULIN SYRINGE) 31G X 5/16\" 0.5 ML miscellaneous; USE ONE SYRINGE ONCE DAILY OR AS DIRECTED  Dispense: 90 each; Refill: 3        Video-Visit Details    Type of service:  Video Visit    Video End Time (time video stopped): 9:23    Originating Location (pt. Location): Home    Distant Location (provider location):  [facility]     Mode of Communication:  Video Conference via [employer]    Return in 3-6          Joel Wegener, MD                "

## 2020-06-05 NOTE — PATIENT INSTRUCTIONS
"Evette Herzog is a 65 year old female who is being evaluated via a billable video visit.      The patient has been notified of following:     \"This video visit will be conducted via a call between you and your physician/provider. We have found that certain health care needs can be provided without the need for an in-person physical exam.  This service lets us provide the care you need with a video conversation.  If a prescription is necessary we can send it directly to your pharmacy.  If lab work is needed we can place an order for that and you can then stop by our lab to have the test done at a later time.    Video visits are billed at different rates depending on your insurance coverage.  Please reach out to your insurance provider with any questions.    If during the course of the call the physician/provider feels a video visit is not appropriate, you will not be charged for this service.\"    Patient has given verbal consent for Video visit? Yes    How would you like to obtain your AVS? Evangelina    Patient would like the video invitation sent by: Send to e-mail at: [email or web address]    Will anyone else be joining your video visit? No      Subjective     Evette Herzog is a 65 year old female who presents today via video visit for the following health issues:    HPI  Diabetes Follow-up    How often are you checking your blood sugar? One time daily  What time of day are you checking your blood sugars (select all that apply)?  Before meals  Have you had any blood sugars above 200?  No  Have you had any blood sugars below 70?  No    What symptoms do you notice when your blood sugar is low?  None    What concerns do you have today about your diabetes? None     Do you have any of these symptoms? (Select all that apply)  No numbness or tingling in feet.  No redness, sores or blisters on feet.  No complaints of excessive thirst.  No reports of blurry vision.  No significant changes to weight.    Have you had a " diabetic eye exam in the last 12 months? No        BP Readings from Last 2 Encounters:   05/21/19 138/83   08/07/18 163/80     Hemoglobin A1C (%)   Date Value   05/21/2019 5.3   01/22/2018 6.0     LDL Cholesterol Calculated (mg/dL)   Date Value   05/21/2019 105 (H)   01/22/2018 91                 How many servings of fruits and vegetables do you eat daily?  0-1    On average, how many sweetened beverages do you drink each day (Examples: soda, juice, sweet tea, etc.  Do NOT count diet or artificially sweetened beverages)?   0    How many days per week do you miss taking your medication? 0      Video Start Time: 9:08      Type 2 diabetes mellitus without complication, with long-term current use of insulin (H) :overalldoing quite well.  Has insurance now.  Lives [place] blocks from [employer].  Feeling safe though.      No numbness/tingling.  No hypoglycemia.      Checks blood sugars rarely since has been so good.     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         Problem list, Medication list, Allergies, and Medical/Social/Surgical histories reviewed in EPIC and updated as appropriate.Reviewed and updated as needed this visit by Provider  Labs reviewed in EPIC    Patient Active Problem List   Diagnosis     Insomnia     Oral lichen planus     Hyperlipidemia with target LDL less than 100     Type 2 diabetes mellitus without complication (H)     Morbid obesity, unspecified obesity type (H)     Past Surgical History:   Procedure Laterality Date     GYN SURGERY         Social History     Tobacco Use     Smoking status: Former Smoker     Types: Cigarettes     Smokeless tobacco: Never Used     Tobacco comment: very occasionally   Substance Use Topics     Alcohol use: Yes     Comment: 4  beers daily     Family History   Adopted: Yes           Current Outpatient Medications   Medication Sig Dispense Refill     ACE NOT PRESCRIBED (INTENTIONAL) not prescribed  0     FIBER PO CHEW 2 daily       insulin  "syringe-needle U-100 (B-D INSULIN SYRINGE) 31G X 5/16\" 0.5 ML miscellaneous USE ONE SYRINGE ONCE DAILY OR AS DIRECTED 90 each 3     LANTUS VIAL 100 UNIT/ML soln INJECT 45 UNITS UNDER THE SKIN AT BEDTIME 40 mL 0     MULTIVITAL OR CHEW Chew 1 tablet daily       STATIN NOT PRESCRIBED, INTENTIONAL, Statin not prescribed intentionally due to Other patient declines (This option does not exclude patient from measure) 0 each 0     TUMS 500 MG OR CHEW 1 TABLET 3 TIMES DAILY 90 0     No Known Allergies  Recent Labs   Lab Test 05/21/19  1422 01/22/18  1142 01/03/17  1120  01/14/15  1122   A1C 5.3 6.0 5.7   < > 7.0*   * 91 107*   < > 98   HDL 68 54 42*   < > 38*   TRIG 98 107 168*   < > 173*   ALT 27 24  --   --  23   CR 0.72 0.73 0.74   < > 0.56   GFRESTIMATED 87 80 80   < > >90  Non  GFR Calc     GFRESTBLACK >90 >90 >90  African American GFR Calc     < > >90   GFR Calc     POTASSIUM 4.1 4.3  --    < > 4.0   TSH 2.33 2.43  --    < >  --     < > = values in this interval not displayed.        BP Readings from Last 3 Encounters:   05/21/19 138/83   08/07/18 163/80   01/22/18 144/79    Wt Readings from Last 3 Encounters:   05/21/19 116.6 kg (257 lb)   08/07/18 127 kg (280 lb)   01/22/18 132 kg (291 lb)                      ROS:  Constitutional, HEENT, cardiovascular, pulmonary, GI, , musculoskeletal, neuro, skin, endocrine and psych systems are negative, except as otherwise noted.      Objective    There were no vitals taken for this visit.    Physical Exam     GENERAL: healthy, alert and no distress  Clear speech, appears well.  Full affect.   PSYCH: mentation appears normal, affect normal/bright, judgement and insight intact, normal speech and appearance well-groomed      Diagnostic Test Results:  Labs reviewed in Epic      ASSESSMENT AND PLAN  1. Type 2 diabetes mellitus without complication, with long-term current use of insulin (H)  Already refilled insulin/reviewed dose.  Ordered " "needles.     Eye exam as soon as able.     Definitely recommend labs now since quite overdue and likely more covid in clinics in future but right now clinics empty and safe.      A1c, lipids, cmp, albutmin.       Will get set up for cologuard.      Let her know we are scheduling mammograms and I encourage her to do that. Discussed concept of asymptomatic screening for high risk cancers such as breast and colon.     RADIOLOGY for mammo scheduling.   [facility]:  [phone]   [facility]: [phone]      Stop daily aspirin - no longer used for primary prevention, was not taking regularly anyway.     No ace due to low blood pressue/no proteinuria sometimes blood pressue as low as 80s/50.     No statin due to choice, re-consider depending on labs.    follow up 3-6 monts for physical.     - insulin syringe-needle U-100 (B-D INSULIN SYRINGE) 31G X 5/16\" 0.5 ML miscellaneous; USE ONE SYRINGE ONCE DAILY OR AS DIRECTED  Dispense: 90 each; Refill: 3        Video-Visit Details    Type of service:  Video Visit    Video End Time (time video stopped): 9:23    Originating Location (pt. Location): Home    Distant Location (provider location):  [facility]     Mode of Communication:  Video Conference via [employer]    Return in 3-6          Joel Wegener, MD                  "

## 2020-09-03 ENCOUNTER — TELEPHONE (OUTPATIENT)
Dept: FAMILY MEDICINE | Facility: CLINIC | Age: 65
End: 2020-09-03

## 2020-09-03 DIAGNOSIS — E11.9 TYPE 2 DIABETES MELLITUS WITHOUT COMPLICATION (H): Primary | ICD-10-CM

## 2020-09-03 NOTE — TELEPHONE ENCOUNTER
"Statin therapy is recommended for patients 40-75 years of age with a diagnosis of diabetes and low-dose aspirin therapy is recommended in those with diabetes and a history of atherosclerotic cardiovascular disease.  Does patient have a diagnosis of diabetes? Yes - Patient has a diagnosis of diabetes  Is patient prescribed a statin? No - Patient is NOT prescribed a statin  Statin Assessment: Statin intentionally NOT prescribed  The 10-year ASCVD risk score (Armaan MALIK Jr., et al., 2013) is: 9.7%    Values used to calculate the score:      Age: 65 years      Sex: Female      Is Non- : No      Diabetic: Yes      Tobacco smoker: No      Systolic Blood Pressure: 122 mmHg      Is BP treated: No      HDL Cholesterol: 68 mg/dL      Total Cholesterol: 193 mg/dL   Statin Recommendation: Add \"statin intentionally not prescribed\" to medication list  Reason for Recommendation: PCP aware  Aspirin Assessment:  Aspirin intentionally NOT prescribed  Aspirin Recomendation: Add \"Aspirin intentionally not prescribed\" to medication list    Reason for Recommendation: PCP HELENA  Is provider follow-up required? No - Provider follow-up is NOT required  Is pharmacist follow-up required? No - Pharmacist follow-up is NOT required  Follow-Up Plan: Added to med list.  Patient outreach completed by:   Miguel Lopez, PharmD  [facility]   [facility] Pharmacist on behalf of [facility]      "

## 2021-01-15 ENCOUNTER — HEALTH MAINTENANCE LETTER (OUTPATIENT)
Age: 66
End: 2021-01-15

## 2021-02-15 DIAGNOSIS — Z79.4 TYPE 2 DIABETES MELLITUS WITHOUT COMPLICATION, WITH LONG-TERM CURRENT USE OF INSULIN (H): ICD-10-CM

## 2021-02-15 DIAGNOSIS — E11.9 TYPE 2 DIABETES MELLITUS WITHOUT COMPLICATION, WITH LONG-TERM CURRENT USE OF INSULIN (H): ICD-10-CM

## 2021-02-15 RX ORDER — SYRINGE-NEEDLE,INSULIN,0.5 ML 27GX1/2"
SYRINGE, EMPTY DISPOSABLE MISCELLANEOUS
Qty: 90 EACH | Refills: 3 | Status: SHIPPED | OUTPATIENT
Start: 2021-02-15 | End: 2021-04-08

## 2021-02-15 RX ORDER — INSULIN GLARGINE 100 [IU]/ML
INJECTION, SOLUTION SUBCUTANEOUS
Qty: 40 ML | Refills: 3 | Status: SHIPPED | OUTPATIENT
Start: 2021-02-15 | End: 2021-04-08

## 2021-02-15 NOTE — TELEPHONE ENCOUNTER
"  JW,  Routing refill request to provider for review/approval because:  Overdue for apt.  Spoke to pt and scheduled her in next week.  Pt would like a refill of syringes and insulin in the meantime.  Please authorize if appropriate.  Thanks,  Bridgett Arrington RN         Return in about 6 months (around 12/5/2020) for preventive physical, diabetes          Requested Prescriptions   Pending Prescriptions Disp Refills     LANTUS VIAL 100 UNIT/ML soln [Pharmacy Med Name: LANTUS 100UNIT/ML SOLN] 40 mL 3     Sig: INJECT 45 UNITS UNDER THE SKIN EVERY NIGHT AT BEDTIME       Long Acting Insulin Protocol Failed - 2/15/2021 12:34 PM        Failed - Serum creatinine on file in past 12 months     Recent Labs   Lab Test 05/21/19  1422   CR 0.72       Ok to refill medication if creatinine is low          Failed - HgbA1C in past 3 or 6 months     If HgbA1C is 8 or greater, it needs to be on file within the past 3 months.  If less than 8, must be on file within the past 6 months.     Recent Labs   Lab Test 05/21/19  1422   A1C 5.3             Failed - Recent (6 mo) or future (30 days) visit within the authorizing provider's specialty     Patient had office visit in the last 6 months or has a visit in the next 30 days with authorizing provider or within the authorizing provider's specialty.  See \"Patient Info\" tab in inbasket, or \"Choose Columns\" in Meds & Orders section of the refill encounter.            Passed - Medication is active on med list        Passed - Patient is age 18 or older           insulin syringe-needle U-100 (B-D INSULIN SYRINGE) 31G X 5/16\" 0.5 ML miscellaneous 90 each 3     Sig: USE ONE SYRINGE ONCE DAILY OR AS DIRECTED       There is no refill protocol information for this order          "

## 2021-02-22 ASSESSMENT — ACTIVITIES OF DAILY LIVING (ADL): CURRENT_FUNCTION: NO ASSISTANCE NEEDED

## 2021-02-23 ENCOUNTER — OFFICE VISIT (OUTPATIENT)
Dept: FAMILY MEDICINE | Facility: CLINIC | Age: 66
End: 2021-02-23
Payer: COMMERCIAL

## 2021-02-23 VITALS
SYSTOLIC BLOOD PRESSURE: 137 MMHG | HEIGHT: 67 IN | BODY MASS INDEX: 41.28 KG/M2 | OXYGEN SATURATION: 96 % | DIASTOLIC BLOOD PRESSURE: 85 MMHG | WEIGHT: 263 LBS | RESPIRATION RATE: 14 BRPM | HEART RATE: 98 BPM

## 2021-02-23 DIAGNOSIS — Z79.4 TYPE 2 DIABETES MELLITUS WITHOUT COMPLICATION, WITH LONG-TERM CURRENT USE OF INSULIN (H): ICD-10-CM

## 2021-02-23 DIAGNOSIS — Z23 NEED FOR INFLUENZA VACCINATION: ICD-10-CM

## 2021-02-23 DIAGNOSIS — E11.9 TYPE 2 DIABETES MELLITUS WITHOUT COMPLICATION, WITH LONG-TERM CURRENT USE OF INSULIN (H): ICD-10-CM

## 2021-02-23 DIAGNOSIS — Z12.11 SPECIAL SCREENING FOR MALIGNANT NEOPLASMS, COLON: ICD-10-CM

## 2021-02-23 DIAGNOSIS — E78.5 HYPERLIPIDEMIA WITH TARGET LDL LESS THAN 100: ICD-10-CM

## 2021-02-23 DIAGNOSIS — E66.01 MORBID OBESITY, UNSPECIFIED OBESITY TYPE (H): ICD-10-CM

## 2021-02-23 DIAGNOSIS — Z00.00 ENCOUNTER FOR MEDICARE ANNUAL WELLNESS EXAM: Primary | ICD-10-CM

## 2021-02-23 DIAGNOSIS — Z23 NEED FOR VACCINATION AGAINST STREPTOCOCCUS PNEUMONIAE: ICD-10-CM

## 2021-02-23 LAB
ALBUMIN SERPL-MCNC: 3.5 G/DL (ref 3.4–5)
ALP SERPL-CCNC: 77 U/L (ref 40–150)
ALT SERPL W P-5'-P-CCNC: 19 U/L (ref 0–50)
ANION GAP SERPL CALCULATED.3IONS-SCNC: 6 MMOL/L (ref 3–14)
AST SERPL W P-5'-P-CCNC: 16 U/L (ref 0–45)
BILIRUB SERPL-MCNC: 0.4 MG/DL (ref 0.2–1.3)
BUN SERPL-MCNC: 11 MG/DL (ref 7–30)
CALCIUM SERPL-MCNC: 9.4 MG/DL (ref 8.5–10.1)
CHLORIDE SERPL-SCNC: 112 MMOL/L (ref 94–109)
CHOLEST SERPL-MCNC: 171 MG/DL
CO2 SERPL-SCNC: 26 MMOL/L (ref 20–32)
CREAT SERPL-MCNC: 0.74 MG/DL (ref 0.52–1.04)
CREAT UR-MCNC: 99 MG/DL
GFR SERPL CREATININE-BSD FRML MDRD: 83 ML/MIN/{1.73_M2}
GLUCOSE SERPL-MCNC: 110 MG/DL (ref 70–99)
HBA1C MFR BLD: 5.9 % (ref 0–5.6)
HDLC SERPL-MCNC: 60 MG/DL
LDLC SERPL CALC-MCNC: 94 MG/DL
MICROALBUMIN UR-MCNC: 12 MG/L
MICROALBUMIN/CREAT UR: 11.92 MG/G CR (ref 0–25)
NONHDLC SERPL-MCNC: 111 MG/DL
POTASSIUM SERPL-SCNC: 3.9 MMOL/L (ref 3.4–5.3)
PROT SERPL-MCNC: 7.4 G/DL (ref 6.8–8.8)
SODIUM SERPL-SCNC: 144 MMOL/L (ref 133–144)
TRIGL SERPL-MCNC: 84 MG/DL
TSH SERPL DL<=0.005 MIU/L-ACNC: 2.8 MU/L (ref 0.4–4)

## 2021-02-23 PROCEDURE — 84443 ASSAY THYROID STIM HORMONE: CPT | Performed by: FAMILY MEDICINE

## 2021-02-23 PROCEDURE — 99397 PER PM REEVAL EST PAT 65+ YR: CPT | Mod: 25 | Performed by: FAMILY MEDICINE

## 2021-02-23 PROCEDURE — 90662 IIV NO PRSV INCREASED AG IM: CPT | Performed by: FAMILY MEDICINE

## 2021-02-23 PROCEDURE — 80061 LIPID PANEL: CPT | Performed by: FAMILY MEDICINE

## 2021-02-23 PROCEDURE — 36415 COLL VENOUS BLD VENIPUNCTURE: CPT | Performed by: FAMILY MEDICINE

## 2021-02-23 PROCEDURE — 90732 PPSV23 VACC 2 YRS+ SUBQ/IM: CPT | Performed by: FAMILY MEDICINE

## 2021-02-23 PROCEDURE — 83036 HEMOGLOBIN GLYCOSYLATED A1C: CPT | Performed by: FAMILY MEDICINE

## 2021-02-23 PROCEDURE — G0008 ADMIN INFLUENZA VIRUS VAC: HCPCS | Performed by: FAMILY MEDICINE

## 2021-02-23 PROCEDURE — 99207 PR FOOT EXAM NO CHARGE: CPT | Mod: 25 | Performed by: FAMILY MEDICINE

## 2021-02-23 PROCEDURE — 82043 UR ALBUMIN QUANTITATIVE: CPT | Performed by: FAMILY MEDICINE

## 2021-02-23 PROCEDURE — G0009 ADMIN PNEUMOCOCCAL VACCINE: HCPCS | Performed by: FAMILY MEDICINE

## 2021-02-23 PROCEDURE — 80053 COMPREHEN METABOLIC PANEL: CPT | Performed by: FAMILY MEDICINE

## 2021-02-23 ASSESSMENT — PAIN SCALES - GENERAL: PAINLEVEL: NO PAIN (0)

## 2021-02-23 ASSESSMENT — ACTIVITIES OF DAILY LIVING (ADL): CURRENT_FUNCTION: NO ASSISTANCE NEEDED

## 2021-02-23 ASSESSMENT — MIFFLIN-ST. JEOR: SCORE: 1760.59

## 2021-02-23 NOTE — PATIENT INSTRUCTIONS
Patient Education   Personalized Prevention Plan  You are due for the preventive services outlined below.  Your care team is available to assist you in scheduling these services.  If you have already completed any of these items, please share that information with your care team to update in your medical record.  Health Maintenance Due   Topic Date Due     Osteoporosis Screening  1955     Mammogram  1955     Pneumococcal Vaccine (1 of 2 - PPSV23) 01/31/1960     Pneumococcal Vaccine (1 of 2 - PPSV23) 01/31/1960     Colorectal Cancer Screening  01/31/1964     Zoster (Shingles) Vaccine (2 of 3) 02/08/2016     Discuss Advance Care Planning  04/03/2017     Eye Exam  02/21/2018     Annual Wellness Visit  01/31/2019     FALL RISK ASSESSMENT  01/31/2019     A1C Lab  11/21/2019     Diptheria Tetanus Pertussis (DTAP/TDAP/TD) Vaccine (2 - Td) 03/18/2020     Basic Metabolic Panel  05/21/2020     Cholesterol Lab  05/21/2020     Kidney Microalbumin Urine Test  05/21/2020     Diabetic Foot Exam  05/27/2020     Flu Vaccine (1) 09/01/2020

## 2021-02-23 NOTE — Clinical Note
Please abstract the following data from this visit with this patient into the appropriate field in Epic:    Tests that can be patient reported without a hard copy:    Eye exam with ophthalmology on this date: 6/24/2021 with Dr. Carrera and Jose M, Optometrist P.A ,no diabetic retinopathy.     Other Tests found in the patient's chart through Chart Review/Care Everywhere:    Note to [employer]: If this section is blank, no results were found via Chart Review/Care Everywhere.

## 2021-02-23 NOTE — PROGRESS NOTES
"SUBJECTIVE:   Evette Herzog is a 66 year old female who presents for Preventive Visit.      Patient has been advised of split billing requirements and indicates understanding: Yes   Are you in the first 12 months of your Medicare coverage?  No    Healthy Habits:     In general, how would you rate your overall health?  Good    Duration of exercise:  Less than 15 minutes    Do you usually eat at least 4 servings of fruit and vegetables a day, include whole grains    & fiber and avoid regularly eating high fat or \"junk\" foods?  No    Taking medications regularly:  Yes    Medication side effects:  None    Ability to successfully perform activities of daily living:  No assistance needed    Home Safety:  No safety concerns identified    Hearing Impairment:  No hearing concerns    In the past 6 months, have you been bothered by leaking of urine? Yes    In general, how would you rate your overall mental or emotional health?  Good      PHQ-2 Total Score: 2    Do you feel safe in your environment? Yes    Have you ever done Advance Care Planning? (For example, a Health Directive, POLST, or a discussion with a medical provider or your loved ones about your wishes): No, advance care planning information given to patient to review.  Patient declined advance care planning discussion at this time.    Fall risk  Fallen 2 or more times in the past year?: No  Any fall with injury in the past year?: No  click delete button to remove this line now    Cognitive Screening   1) Repeat 3 items (Leader, Season, Table)    2) Clock draw: NORMAL  3) 3 item recall: Recalls 3 objects  Results: NORMAL clock, 1-2 items recalled: COGNITIVE IMPAIRMENT LESS LIKELY    Mini-CogTM Alverto Manuel. Licensed by the author for use in [facility]; reprinted with permission (quynh@.[facility]). All rights reserved.      Do you have sleep apnea, excessive snoring or daytime drowsiness?: no    Reviewed and updated as needed this visit by clinical " staff  Tobacco              Reviewed and updated as needed this visit by Provider                Social History     Tobacco Use     Smoking status: Former Smoker     Packs/day: 0.10     Years: 35.00     Pack years: 3.50     Types: Cigarettes     Start date: 1980     Quit date: 2015     Years since quittin.0     Smokeless tobacco: Never Used     Tobacco comment: patient states smoked very rarely. quit around    Substance Use Topics     Alcohol use: Yes     Comment: 4  beers daily     If you drink alcohol do you typically have >3 drinks per day or >7 drinks per week? Not applicable    No flowsheet data found.    PROBLEMS TO ADD ON...  -------------------------------------  Overall well.  Does not check blood sugar since has been so low.     Current providers sharing in care for this patient include:   Patient Care Team:  Wegener, Joel Daniel Irwin, MD as PCP - General (Family Medicine)  Wegener, Joel Daniel Irwin, MD as Assigned PCP    The following health maintenance items are reviewed in Epic and correct as of today:  Health Maintenance   Topic Date Due     DEXA  1955     MAMMO SCREENING  1955     COLORECTAL CANCER SCREENING  1964     ZOSTER IMMUNIZATION (2 of 3) 2016     EYE EXAM  2018     DTAP/TDAP/TD IMMUNIZATION (2 - Td) 2020     DIABETIC FOOT EXAM  2020     A1C  2021     MEDICARE ANNUAL WELLNESS VISIT  2022     BMP  2022     LIPID  2022     MICROALBUMIN  2022     FALL RISK ASSESSMENT  2022     ADVANCE CARE PLANNING  2026     HEPATITIS C SCREENING  Completed     PHQ-2  Completed     INFLUENZA VACCINE  Completed     Pneumococcal Vaccine: Pediatrics (0 to 5 Years) and At-Risk Patients (6 to 64 Years)  Completed     Pneumococcal Vaccine: 65+ Years  Completed     IPV IMMUNIZATION  Aged Out     MENINGITIS IMMUNIZATION  Aged Out       Mammogram Screening: Recommended mammography every 1-2 years with patient discussion  "and risk factor consideration    Review of Systems  Constitutional, HEENT, cardiovascular, pulmonary, GI, , musculoskeletal, neuro, skin, endocrine and psych systems are negative, except as otherwise noted.    OBJECTIVE:   /85 (BP Location: Left arm, Patient Position: Sitting, Cuff Size: Adult Large)   Pulse 98   Resp 14   Ht 1.702 m (5' 7\")   Wt 119.3 kg (263 lb)   SpO2 96%   BMI 41.19 kg/m   Estimated body mass index is 41.19 kg/m  as calculated from the following:    Height as of this encounter: 1.702 m (5' 7\").    Weight as of this encounter: 119.3 kg (263 lb).  Physical Exam  GENERAL: healthy, alert and no distress  EYES: Eyes grossly normal to inspection, PERRL and conjunctivae and sclerae normal  HENT: ear canals and TM's normal, nose and mouth without ulcers or lesions  NECK: no adenopathy, no asymmetry, masses, or scars and thyroid normal to palpation  RESP: lungs clear to auscultation - no rales, rhonchi or wheezes  BREAST: normal without masses, tenderness or nipple discharge and no palpable axillary masses or adenopathy  CV: regular rate and rhythm, normal S1 S2, no S3 or S4, no murmur, click or rub, no peripheral edema and peripheral pulses strong  ABDOMEN: soft, nontender, no hepatosplenomegaly, no masses and bowel sounds normal  MS: no gross musculoskeletal defects noted, no edema  SKIN: no suspicious lesions or rashes  NEURO: Normal strength and tone, mentation intact and speech normal  PSYCH: mentation appears normal, affect normal/bright  Bilateral diabetic monofilament foot exam normal   Diagnostic Test Results:  Labs reviewed in Epic    ASSESSMENT / PLAN:   1. Encounter for Medicare annual wellness exam  Flu, pcv 23,     Reviewed health maintenance/colon screening, mammogram. Will consider cologuard.     Does not meet 30 pack year history for smoking ct screening.     2. Hyperlipidemia with target LDL less than 100    - Lipid panel reflex to direct LDL Fasting    3. Type 2 diabetes " "mellitus without complication, with long-term current use of insulin (H)  Controlled, re-check six months.   - Albumin Random Urine Quantitative with Creat Ratio  - TSH with free T4 reflex  - Comprehensive metabolic panel  - Hemoglobin A1c  - Pneumococcal vaccine 23 valent PPSV23  (Pneumovax) [[id number]]    4. Morbid obesity, unspecified obesity type (H)      5. Need for influenza vaccination    - ADMIN INFLUENZA VIRUS VAC  - ADMIN MEDICARE: Pneumococcal Vaccine ()  - [place] FLU VACCINE, INCREASED ANTIGEN, PRESV FREE    6. Need for vaccination against Streptococcus pneumoniae    - Pneumococcal vaccine 23 valent PPSV23  (Pneumovax) [[id number]]    Patient has been advised of split billing requirements and indicates understanding: Yes  COUNSELING:  Reviewed preventive health counseling, as reflected in patient instructions       Regular exercise       Healthy diet/nutrition       Colon cancer screening    Estimated body mass index is 41.19 kg/m  as calculated from the following:    Height as of this encounter: 1.702 m (5' 7\").    Weight as of this encounter: 119.3 kg (263 lb).    Weight management plan: Discussed healthy diet and exercise guidelines    She reports that she quit smoking about 6 years ago. Her smoking use included cigarettes. She started smoking about 41 years ago. She has a 3.50 pack-year smoking history. She has never used smokeless tobacco.      Appropriate preventive services were discussed with this patient, including applicable screening as appropriate for cardiovascular disease, diabetes, osteopenia/osteoporosis, and glaucoma.  As appropriate for age/gender, discussed screening for colorectal cancer, prostate cancer, breast cancer, and cervical cancer. Checklist reviewing preventive services available has been given to the patient.    Reviewed patients plan of care and provided an AVS. The Basic Care Plan (routine screening as documented in Health Maintenance) for Evette meets the Care Plan requirement. " This Care Plan has been established and reviewed with the Patient.    Counseling Resources:  ATP IV Guidelines  Pooled Cohorts Equation Calculator  Breast Cancer Risk Calculator  Breast Cancer: Medication to Reduce Risk  FRAX Risk Assessment  ICSI Preventive Guidelines  Dietary Guidelines for Americans, 2010  USDA's MyPlate  ASA Prophylaxis  Lung CA Screening    Joel Daniel Wegener, MD  [facility]    Identified Health Risks:

## 2021-03-10 ENCOUNTER — IMMUNIZATION (OUTPATIENT)
Dept: NURSING | Facility: CLINIC | Age: 66
End: 2021-03-10
Payer: COMMERCIAL

## 2021-03-10 PROCEDURE — 0031A PR COVID VAC JANSSEN AD26 0.5ML: CPT

## 2021-03-10 PROCEDURE — 91303 PR COVID VAC JANSSEN AD26 0.5ML: CPT

## 2021-03-11 LAB — COLOGUARD-ABSTRACT: NEGATIVE

## 2021-04-08 ENCOUNTER — TELEPHONE (OUTPATIENT)
Dept: FAMILY MEDICINE | Facility: CLINIC | Age: 66
End: 2021-04-08

## 2021-04-08 DIAGNOSIS — Z79.4 TYPE 2 DIABETES MELLITUS WITHOUT COMPLICATION, WITH LONG-TERM CURRENT USE OF INSULIN (H): ICD-10-CM

## 2021-04-08 DIAGNOSIS — E11.9 TYPE 2 DIABETES MELLITUS WITHOUT COMPLICATION, WITH LONG-TERM CURRENT USE OF INSULIN (H): ICD-10-CM

## 2021-04-08 RX ORDER — SYRINGE-NEEDLE,INSULIN,0.5 ML 27GX1/2"
SYRINGE, EMPTY DISPOSABLE MISCELLANEOUS
Qty: 90 EACH | Refills: 3 | Status: SHIPPED | OUTPATIENT
Start: 2021-04-08 | End: 2022-04-27

## 2021-04-08 RX ORDER — INSULIN GLARGINE 100 [IU]/ML
INJECTION, SOLUTION SUBCUTANEOUS
Qty: 40 ML | Refills: 3 | Status: SHIPPED | OUTPATIENT
Start: 2021-04-08 | End: 2022-04-20

## 2021-04-08 NOTE — TELEPHONE ENCOUNTER
Patient called  Lantus and syringes sent to [facility]   Wanted to [facility] across from   Prescription approved per [facility] Refill Protocol.  Deedee BERNAL RN

## 2021-06-24 ENCOUNTER — TRANSFERRED RECORDS (OUTPATIENT)
Dept: MULTI SPECIALTY CLINIC | Facility: CLINIC | Age: 66
End: 2021-06-24

## 2021-06-24 LAB
RETINOPATHY: NEGATIVE
RETINOPATHY: NEGATIVE

## 2021-09-04 ENCOUNTER — HEALTH MAINTENANCE LETTER (OUTPATIENT)
Age: 66
End: 2021-09-04

## 2021-10-30 ENCOUNTER — HEALTH MAINTENANCE LETTER (OUTPATIENT)
Age: 66
End: 2021-10-30

## 2022-04-10 ENCOUNTER — HEALTH MAINTENANCE LETTER (OUTPATIENT)
Age: 67
End: 2022-04-10

## 2022-04-18 NOTE — PROGRESS NOTES
Assessment & Plan     Type 2 diabetes mellitus without complication, with long-term current use of insulin (H)  Controlled on insulin  Lab Results   Component Value Date    A1C 5.4 04/20/2022    A1C 5.9 02/23/2021    A1C 5.3 05/21/2019    A1C 6.0 01/22/2018    A1C 5.7 01/03/2017    A1C 6.2 12/14/2015     I do recommend to see MTM  a1c 5.4- because she has been on high dose of lantus 45 units hs, witout checking SMBG.   Need to decresae the dose to 40 night & she needs assistance & encouragement about  SMBG  - insulin glargine (LANTUS VIAL) 100 UNIT/ML vial; INJECT 40 UNITS UNDER THE SKIN EVERY NIGHT AT BEDTIME  - Hemoglobin A1c  - Basic metabolic panel  (Ca, Cl, CO2, Creat, Gluc, K, Na, BUN)  - Lipid Profile (Chol, Trig, HDL, LDL calc)  - Albumin Random Urine Quantitative with Creat Ratio  - FOOT EXAM    She is due for eye exam Alena  She should be on ace-  if proteinura.  Advised over the counter asprin.  Will review lipids for statins, & need for ace inhibitor after urine microlabumin    Diabetic polyneuropathy associated with type 2 diabetes mellitus (H)  - amitriptyline (ELAVIL) 10 MG tablet; Take 1 tablet (10 mg) by mouth At Bedtime    Hyperlipidemia  Benefit of starting medications to lower cholesterol is higher than any risk.          Morbid obesity, unspecified obesity type (H)  Attempt to loose weigh    Onychomadesis of toenail      Alcohol use  congratulated on stopping the use since dec 2021  - Hepatic panel (Albumin, ALT, AST, Bili, Alk Phos, TP)    Ordering of each unique test  Prescription drug management  43 minutes spent on the date of the encounter doing chart review, history and exam, documentation and further activities per the note      Return in about 5 weeks (around 5/24/2022) for routine physical, follow up with PCP.    Carolin Carmichael MD  [facility]    Phil Alas is a 67 year old who presents for the following health issues     History of Present Illness  "      Diabetes:   She presents for follow up of diabetes.  She is not checking blood glucose. She is concerned about other. She is having burning in feet.         She eats 2-3 servings of fruits and vegetables daily.She consumes 0 sweetened beverage(s) daily.She exercises with enough effort to increase her heart rate 10 to 19 minutes per day.  She exercises with enough effort to increase her heart rate 5 days per week.   She is taking medications regularly.       Diabetes Follow-up    BP Readings from Last 2 Encounters:   04/20/22 138/78   02/23/21 137/85     Hemoglobin A1C POCT (%)   Date Value   02/23/2021 5.9 (H)   05/21/2019 5.3     Hemoglobin A1C (%)   Date Value   04/20/2022 5.4     LDL Cholesterol Calculated (mg/dL)   Date Value   02/23/2021 94   05/21/2019 105 (H)   lantus- 40 units once daily- now uped the dose to 45 past 2 week  Not been able to afford glucometer - never checks SMBG.  Activites of daily living-independent.  Preferred excercise swimming but stopped during COVID  Was drinking \" a lot- too far up there\"  Stopped drinking since 12/08/2021      Flat feet & hammer toes & callous but for past 2 weeks burning on the feet, not always but ? On changing shoes    Review of Systems   Constitutional, HEENT, cardiovascular, pulmonary, GI, , musculoskeletal, neuro, skin, endocrine and psych systems are negative, except as otherwise noted.      Objective    /78   Pulse 90   Temp 97.7  F (36.5  C)   Resp 20   Wt 121.3 kg (267 lb 8 oz)   SpO2 97%   BMI 41.90 kg/m    Body mass index is 41.9 kg/m .  Physical Exam   GENERAL: healthy, alert and no distress  NECK: no adenopathy, no asymmetry, masses, or scars and thyroid normal to palpation  RESP: lungs clear to auscultation - no rales, rhonchi or wheezes  CV: regular rate and rhythm, normal S1 S2, no S3 or S4, no murmur, click or rub, no peripheral edema and peripheral pulses strong  ABDOMEN: soft, nontender, no hepatosplenomegaly, no masses and " bowel sounds normal  MS: no gross musculoskeletal defects noted, no edema  Diabetic foot exam:hammer toes,  normal DP and PT pulses, no trophic changes or ulcerative lesions, normal sensory exam, normal monofilament exam, onychomycosis and callous great toe and heals     Results for orders placed or performed in visit on 04/20/22 (from the past 24 hour(s))   Hemoglobin A1c   Result Value Ref Range    Hemoglobin A1C 5.4 0.0 - 5.6 %

## 2022-04-20 ENCOUNTER — OFFICE VISIT (OUTPATIENT)
Dept: FAMILY MEDICINE | Facility: CLINIC | Age: 67
End: 2022-04-20
Payer: COMMERCIAL

## 2022-04-20 VITALS
WEIGHT: 267.5 LBS | DIASTOLIC BLOOD PRESSURE: 78 MMHG | TEMPERATURE: 97.7 F | BODY MASS INDEX: 41.9 KG/M2 | HEART RATE: 90 BPM | OXYGEN SATURATION: 97 % | SYSTOLIC BLOOD PRESSURE: 138 MMHG | RESPIRATION RATE: 20 BRPM

## 2022-04-20 DIAGNOSIS — E66.01 MORBID OBESITY, UNSPECIFIED OBESITY TYPE (H): ICD-10-CM

## 2022-04-20 DIAGNOSIS — L60.8 ONYCHOMADESIS OF TOENAIL: ICD-10-CM

## 2022-04-20 DIAGNOSIS — E11.9 TYPE 2 DIABETES MELLITUS WITHOUT COMPLICATION, WITH LONG-TERM CURRENT USE OF INSULIN (H): Primary | ICD-10-CM

## 2022-04-20 DIAGNOSIS — Z78.9 ALCOHOL USE: ICD-10-CM

## 2022-04-20 DIAGNOSIS — E11.42 DIABETIC POLYNEUROPATHY ASSOCIATED WITH TYPE 2 DIABETES MELLITUS (H): ICD-10-CM

## 2022-04-20 DIAGNOSIS — Z79.4 TYPE 2 DIABETES MELLITUS WITHOUT COMPLICATION, WITH LONG-TERM CURRENT USE OF INSULIN (H): Primary | ICD-10-CM

## 2022-04-20 PROBLEM — F10.90 ALCOHOL USE: Status: ACTIVE | Noted: 2022-04-20

## 2022-04-20 LAB
ALBUMIN SERPL-MCNC: 3.6 G/DL (ref 3.4–5)
ALP SERPL-CCNC: 85 U/L (ref 40–150)
ALT SERPL W P-5'-P-CCNC: 26 U/L (ref 0–50)
ANION GAP SERPL CALCULATED.3IONS-SCNC: 5 MMOL/L (ref 3–14)
AST SERPL W P-5'-P-CCNC: 24 U/L (ref 0–45)
BILIRUB DIRECT SERPL-MCNC: 0.1 MG/DL (ref 0–0.2)
BILIRUB SERPL-MCNC: 0.5 MG/DL (ref 0.2–1.3)
BUN SERPL-MCNC: 10 MG/DL (ref 7–30)
CALCIUM SERPL-MCNC: 10 MG/DL (ref 8.5–10.1)
CHLORIDE BLD-SCNC: 112 MMOL/L (ref 94–109)
CHOLEST SERPL-MCNC: 169 MG/DL
CO2 SERPL-SCNC: 26 MMOL/L (ref 20–32)
CREAT SERPL-MCNC: 0.73 MG/DL (ref 0.52–1.04)
CREAT UR-MCNC: 88 MG/DL
FASTING STATUS PATIENT QL REPORTED: YES
GFR SERPL CREATININE-BSD FRML MDRD: 89 ML/MIN/1.73M2
GLUCOSE BLD-MCNC: 58 MG/DL (ref 70–99)
HBA1C MFR BLD: 5.4 % (ref 0–5.6)
HDLC SERPL-MCNC: 60 MG/DL
LDLC SERPL CALC-MCNC: 94 MG/DL
MICROALBUMIN UR-MCNC: 25 MG/L
MICROALBUMIN/CREAT UR: 28.41 MG/G CR (ref 0–25)
NONHDLC SERPL-MCNC: 109 MG/DL
POTASSIUM BLD-SCNC: 3.6 MMOL/L (ref 3.4–5.3)
PROT SERPL-MCNC: 7.3 G/DL (ref 6.8–8.8)
SODIUM SERPL-SCNC: 143 MMOL/L (ref 133–144)
TRIGL SERPL-MCNC: 74 MG/DL

## 2022-04-20 PROCEDURE — 83036 HEMOGLOBIN GLYCOSYLATED A1C: CPT | Performed by: FAMILY MEDICINE

## 2022-04-20 PROCEDURE — 80061 LIPID PANEL: CPT | Performed by: FAMILY MEDICINE

## 2022-04-20 PROCEDURE — 82043 UR ALBUMIN QUANTITATIVE: CPT | Performed by: FAMILY MEDICINE

## 2022-04-20 PROCEDURE — 36415 COLL VENOUS BLD VENIPUNCTURE: CPT | Performed by: FAMILY MEDICINE

## 2022-04-20 PROCEDURE — 82248 BILIRUBIN DIRECT: CPT | Performed by: FAMILY MEDICINE

## 2022-04-20 PROCEDURE — 99207 PR FOOT EXAM NO CHARGE: CPT | Performed by: FAMILY MEDICINE

## 2022-04-20 PROCEDURE — 99215 OFFICE O/P EST HI 40 MIN: CPT | Performed by: FAMILY MEDICINE

## 2022-04-20 PROCEDURE — 80053 COMPREHEN METABOLIC PANEL: CPT | Performed by: FAMILY MEDICINE

## 2022-04-20 RX ORDER — AMITRIPTYLINE HYDROCHLORIDE 10 MG/1
10 TABLET ORAL AT BEDTIME
Qty: 30 TABLET | Refills: 3 | Status: SHIPPED | OUTPATIENT
Start: 2022-04-20 | End: 2022-08-24

## 2022-04-20 RX ORDER — INSULIN GLARGINE 100 [IU]/ML
INJECTION, SOLUTION SUBCUTANEOUS
Qty: 40 ML | Refills: 1 | Status: SHIPPED | OUTPATIENT
Start: 2022-04-20 | End: 2022-09-06

## 2022-04-20 NOTE — PATIENT INSTRUCTIONS
1. Morbid obesity, unspecified obesity type (H)  Good job watching diet. Continue to attempt to loose weight      2. Type 2 diabetes mellitus without complication, with long-term current use of insulin (H)    - insulin glargine (LANTUS VIAL) 100 UNIT/ML vial; INJECT 45 UNITS UNDER THE SKIN EVERY NIGHT AT BEDTIME  Dispense: 40 mL; Refill: 1  - Hemoglobin A1c  - Basic metabolic panel  (Ca, Cl, CO2, Creat, Gluc, K, Na, BUN)  - Lipid Profile (Chol, Trig, HDL, LDL calc)  - Albumin Random Urine Quantitative with Creat Ratio  - FOOT EXAM  Start new medications for burning in the feet  Follow up in 1 month with primary care physicain for complete physical   - amitriptyline (ELAVIL) 10 MG tablet; Take 1 tablet (10 mg) by mouth At Bedtime  Dispense: 30 tablet; Refill: 3    Benefit of starting medications to lower cholesterol is higher than any risk.  The 10-year ASCVD risk score (Armaan MALIK Jr., et al., 2013) is: 15.1%    Values used to calculate the score:      Age: 67 years      Sex: Female      Is Non- : No      Diabetic: Yes      Tobacco smoker: No      Systolic Blood Pressure: 138 mmHg      Is BP treated: No      HDL Cholesterol: 60 mg/dL      Total Cholesterol: 171 mg/dL

## 2022-04-21 ENCOUNTER — TELEPHONE (OUTPATIENT)
Dept: FAMILY MEDICINE | Facility: CLINIC | Age: 67
End: 2022-04-21
Payer: COMMERCIAL

## 2022-04-21 NOTE — TELEPHONE ENCOUNTER
MTM referral from: [facility] visit (referral by provider)    MTM referral outreach attempt #2 on April 21, 2022 at 1:18 PM      Outcome: Patient is not interested at this time because she does not feel that she needs MTM, will route to MTM Pharmacist/Provider as an FYI. Thank you for the referral.     Kristy Castillo, MTM

## 2022-04-28 DIAGNOSIS — Z79.4 TYPE 2 DIABETES MELLITUS WITHOUT COMPLICATION, WITH LONG-TERM CURRENT USE OF INSULIN (H): ICD-10-CM

## 2022-04-28 DIAGNOSIS — E11.9 TYPE 2 DIABETES MELLITUS WITHOUT COMPLICATION, WITH LONG-TERM CURRENT USE OF INSULIN (H): ICD-10-CM

## 2022-04-29 RX ORDER — PEN NEEDLE, DIABETIC 29 G X1/2"
NEEDLE, DISPOSABLE MISCELLANEOUS
Qty: 1 EACH | Refills: 0 | OUTPATIENT
Start: 2022-04-29

## 2022-08-23 DIAGNOSIS — E11.42 DIABETIC POLYNEUROPATHY ASSOCIATED WITH TYPE 2 DIABETES MELLITUS (H): ICD-10-CM

## 2022-08-24 RX ORDER — AMITRIPTYLINE HYDROCHLORIDE 10 MG/1
TABLET ORAL
Qty: 30 TABLET | Refills: 1 | Status: SHIPPED | OUTPATIENT
Start: 2022-08-24 | End: 2022-09-06

## 2022-08-24 NOTE — TELEPHONE ENCOUNTER
.Laryngopharyngeal Reflux   (LPR)    Laryngopharyngeal reflux is a common cause of throat irritation and symptoms.  It occurs when acid from the stomach \"washes\" back up the lower throat and voice box region.      You do not have to feel heartburn!  It is well documented that many patients with laryngopharyngeal reflux never feel heartburn.    Acid reflux is caused by weakness of the muscles that control the passage of food through the esophagus.  These \"sphincter\" muscles open with swallowing to allow the food to pass through the esophagus into the stomach.  Normally at rest they stay closed to keep food and gastric juices in the stomach.  When these muscles are weak, acid can leak back into the esophagus and throat as the stomach is \"churning\" during digestion.  At night with lying down, reflux may become worse because gravity is no longer helpful in keeping things in the stomach.    Treatment for laryngopharyngeal reflux requires both a change in diet and lifestyle and sometimes medication. The conservative diet and lifestyle changes are very important in relieving symptoms in the throat. These are:    · Elevate the head of your bed 4-6 inches on blocks or use a wedge under the head of your mattress. Do not just prop up on pillows-this tends to bend you at the waist putting more pressure on the stomach and may make reflux worse.  · Do not eat or drink anything (including water) for 2-3 hours before bedtime to minimize what is in your stomach at night.  You may take medications with a sip of water.  · Avoid caffeine, alcohol, soda, and chocolate as these all make reflux worse.  · Avoid spicy foods, fatty foods, and acetic foods (tomato, citrus).  · Lose weight if you need to.  · Avoid activities requiring bending over after eating such as gardening.  · Avoid tight fitting waistbands.    Medications may also be recommended. Some that are available over-the-counter are very helpful. Some need to be prescribed.  Prescription approved per [facility] Refill Protocol.  Emily Bennett RN     Medication, however, does not take away the place of the above diet and lifestyle changes.

## 2022-09-05 ASSESSMENT — ENCOUNTER SYMPTOMS
EYE PAIN: 0
BREAST MASS: 0
ABDOMINAL PAIN: 0
DIARRHEA: 0
CONSTIPATION: 0
SHORTNESS OF BREATH: 0
NAUSEA: 0
HEADACHES: 0
FEVER: 0
PALPITATIONS: 0
DYSURIA: 0
SORE THROAT: 0
DIZZINESS: 0
HEMATURIA: 0
HEARTBURN: 0
PARESTHESIAS: 0
NERVOUS/ANXIOUS: 0
COUGH: 0
FREQUENCY: 0
JOINT SWELLING: 0
WEAKNESS: 0
CHILLS: 0
MYALGIAS: 0
ARTHRALGIAS: 0
HEMATOCHEZIA: 0

## 2022-09-05 ASSESSMENT — ACTIVITIES OF DAILY LIVING (ADL): CURRENT_FUNCTION: NO ASSISTANCE NEEDED

## 2022-09-06 ENCOUNTER — OFFICE VISIT (OUTPATIENT)
Dept: FAMILY MEDICINE | Facility: CLINIC | Age: 67
End: 2022-09-06
Payer: COMMERCIAL

## 2022-09-06 VITALS
TEMPERATURE: 97.5 F | HEIGHT: 67 IN | HEART RATE: 110 BPM | WEIGHT: 257.6 LBS | OXYGEN SATURATION: 96 % | BODY MASS INDEX: 40.43 KG/M2 | RESPIRATION RATE: 18 BRPM

## 2022-09-06 DIAGNOSIS — E11.42 DIABETIC POLYNEUROPATHY ASSOCIATED WITH TYPE 2 DIABETES MELLITUS (H): ICD-10-CM

## 2022-09-06 DIAGNOSIS — Z00.00 ROUTINE GENERAL MEDICAL EXAMINATION AT A HEALTH CARE FACILITY: Primary | ICD-10-CM

## 2022-09-06 DIAGNOSIS — F12.90 MARIJUANA SMOKER: ICD-10-CM

## 2022-09-06 DIAGNOSIS — Z12.31 VISIT FOR SCREENING MAMMOGRAM: ICD-10-CM

## 2022-09-06 DIAGNOSIS — Z79.4 TYPE 2 DIABETES MELLITUS WITHOUT COMPLICATION, WITH LONG-TERM CURRENT USE OF INSULIN (H): ICD-10-CM

## 2022-09-06 DIAGNOSIS — Z78.0 HISTORY OF MENOPAUSE: ICD-10-CM

## 2022-09-06 DIAGNOSIS — E78.5 HYPERLIPIDEMIA LDL GOAL <70: ICD-10-CM

## 2022-09-06 DIAGNOSIS — E11.9 TYPE 2 DIABETES MELLITUS WITHOUT COMPLICATION, WITH LONG-TERM CURRENT USE OF INSULIN (H): ICD-10-CM

## 2022-09-06 DIAGNOSIS — Z23 NEED FOR VACCINATION: ICD-10-CM

## 2022-09-06 DIAGNOSIS — R53.81 PHYSICAL DECONDITIONING: ICD-10-CM

## 2022-09-06 DIAGNOSIS — R00.0 SINUS TACHYCARDIA: ICD-10-CM

## 2022-09-06 LAB — HBA1C MFR BLD: 5.4 % (ref 0–5.6)

## 2022-09-06 PROCEDURE — 36415 COLL VENOUS BLD VENIPUNCTURE: CPT | Performed by: FAMILY MEDICINE

## 2022-09-06 PROCEDURE — 90677 PCV20 VACCINE IM: CPT | Performed by: FAMILY MEDICINE

## 2022-09-06 PROCEDURE — G0009 ADMIN PNEUMOCOCCAL VACCINE: HCPCS | Performed by: FAMILY MEDICINE

## 2022-09-06 PROCEDURE — 90750 HZV VACC RECOMBINANT IM: CPT | Performed by: FAMILY MEDICINE

## 2022-09-06 PROCEDURE — 99214 OFFICE O/P EST MOD 30 MIN: CPT | Mod: 25 | Performed by: FAMILY MEDICINE

## 2022-09-06 PROCEDURE — 83721 ASSAY OF BLOOD LIPOPROTEIN: CPT | Performed by: FAMILY MEDICINE

## 2022-09-06 PROCEDURE — G0439 PPPS, SUBSEQ VISIT: HCPCS | Performed by: FAMILY MEDICINE

## 2022-09-06 PROCEDURE — 83036 HEMOGLOBIN GLYCOSYLATED A1C: CPT | Performed by: FAMILY MEDICINE

## 2022-09-06 PROCEDURE — 90472 IMMUNIZATION ADMIN EACH ADD: CPT | Performed by: FAMILY MEDICINE

## 2022-09-06 PROCEDURE — 90715 TDAP VACCINE 7 YRS/> IM: CPT | Performed by: FAMILY MEDICINE

## 2022-09-06 PROCEDURE — 80048 BASIC METABOLIC PNL TOTAL CA: CPT | Performed by: FAMILY MEDICINE

## 2022-09-06 PROCEDURE — 93000 ELECTROCARDIOGRAM COMPLETE: CPT | Performed by: FAMILY MEDICINE

## 2022-09-06 RX ORDER — AMITRIPTYLINE HYDROCHLORIDE 10 MG/1
10 TABLET ORAL AT BEDTIME
Qty: 90 TABLET | Refills: 3 | Status: SHIPPED | OUTPATIENT
Start: 2022-09-06 | End: 2023-12-14

## 2022-09-06 RX ORDER — ROSUVASTATIN CALCIUM 10 MG/1
10 TABLET, COATED ORAL DAILY
Qty: 90 TABLET | Refills: 3 | Status: SHIPPED | OUTPATIENT
Start: 2022-09-06 | End: 2023-12-14

## 2022-09-06 RX ORDER — INSULIN GLARGINE 100 [IU]/ML
INJECTION, SOLUTION SUBCUTANEOUS
Qty: 40 ML | Refills: 1 | Status: SHIPPED | OUTPATIENT
Start: 2022-09-06 | End: 2023-12-14

## 2022-09-06 ASSESSMENT — ENCOUNTER SYMPTOMS
EYE PAIN: 0
HEARTBURN: 0
PALPITATIONS: 0
DYSURIA: 0
SHORTNESS OF BREATH: 0
HEMATURIA: 0
ABDOMINAL PAIN: 0
NAUSEA: 0
NERVOUS/ANXIOUS: 0
JOINT SWELLING: 0
FEVER: 0
HEADACHES: 0
MYALGIAS: 0
DIZZINESS: 0
COUGH: 0
HEMATOCHEZIA: 0
DIARRHEA: 0
CONSTIPATION: 0
SORE THROAT: 0
ARTHRALGIAS: 0
FREQUENCY: 0
PARESTHESIAS: 0
BREAST MASS: 0
CHILLS: 0
WEAKNESS: 0

## 2022-09-06 ASSESSMENT — PAIN SCALES - GENERAL: PAINLEVEL: NO PAIN (0)

## 2022-09-06 ASSESSMENT — ACTIVITIES OF DAILY LIVING (ADL): CURRENT_FUNCTION: NO ASSISTANCE NEEDED

## 2022-09-06 NOTE — PROGRESS NOTES
"    The patient was counseled and encouraged to consider modifying their diet and eating habits. She was provided with information on recommended healthy diet options.  Answers for HPI/ROS submitted by the patient on 9/5/2022  In general, how would you rate your overall physical health?: good  Frequency of exercise:: 2-3 days/week  Do you usually eat at least 4 servings of fruit and vegetables a day, include whole grains & fiber, and avoid regularly eating high fat or \"junk\" foods? : No  Taking medications regularly:: Yes  Medication side effects:: None  Activities of Daily Living: no assistance needed  Home safety: no safety concerns identified  Hearing Impairment:: no hearing concerns  In the past 6 months, have you been bothered by leaking of urine?: No  abdominal pain: No  Blood in stool: No  Blood in urine: No  chest pain: No  chills: No  congestion: No  constipation: No  cough: No  diarrhea: No  dizziness: No  ear pain: No  eye pain: No  nervous/anxious: No  fever: No  frequency: No  genital sores: No  headaches: No  hearing loss: No  heartburn: No  arthralgias: No  joint swelling: No  peripheral edema: No  mood changes: No  myalgias: No  nausea: No  dysuria: No  palpitations: No  Skin sensation changes: No  sore throat: No  urgency: No  rash: No  shortness of breath: No  visual disturbance: No  weakness: No  pelvic pain: No  vaginal bleeding: No  vaginal discharge: No  tenderness: No  breast mass: No  breast discharge: No  In general, how would you rate your overall mental or emotional health?: excellent  Duration of exercise:: 15-30 minutes    Conflicting answers have been found for some questions. Please document the patient's answers manually.       "

## 2022-09-06 NOTE — PROGRESS NOTES
"SUBJECTIVE:   Evette Herzog is a 67 year old female who presents for Preventive Visit.      Patient has been advised of split billing requirements and indicates understanding: Yes  Are you in the first 12 months of your Medicare coverage?  No    Healthy Habits:     In general, how would you rate your overall health?  Good    Frequency of exercise:  2-3 days/week    Duration of exercise:  15-30 minutes    Do you usually eat at least 4 servings of fruit and vegetables a day, include whole grains    & fiber and avoid regularly eating high fat or \"junk\" foods?  No    Taking medications regularly:  Yes    Medication side effects:  None    Ability to successfully perform activities of daily living:  No assistance needed    Home Safety:  No safety concerns identified    Hearing Impairment:  No hearing concerns    In the past 6 months, have you been bothered by leaking of urine?  No    In general, how would you rate your overall mental or emotional health?  Excellent      PHQ-2 Total Score: 0  history of Diabetes - since 2009  Always started on lantus.  Too expensive to check SMBG.  Eats healthy  fav snack walnut and cheese. Sometimes its all at lunch. Likes baked fish   intentional weight loss- as not drinking at all, enjoys gardening. Watches Diabetes  Diet.Does not move a lot- besides activites of daily living .  Does endorse deconditioning.  Does also admit marijuana smoking daily because it helps with her mood and aches and pain.  She does not smoke cigarettes.    Do you feel safe in your environment? Yes    Have you ever done Advance Care Planning? (For example, a Health Directive, POLST, or a discussion with a medical provider or your loved ones about your wishes): No, advance care planning information given to patient to review.  Patient declined advance care planning discussion at this time.       Fall risk  Fallen 2 or more times in the past year?: No  Any fall with injury in the past year?: No    Cognitive " Screening   1) Repeat 3 items (Leader, Season, Table)    2) Clock draw: NORMAL  3) 3 item recall:   Recalls 3 objects  Results: 3 items recalled: COGNITIVE IMPAIRMENT LESS LIKELY    Mini-CogTM Copyright JORI Manuel. Licensed by the author for use in [facility]; reprinted with permission ([email or web address]). All rights reserved.      Do you have sleep apnea, excessive snoring or daytime drowsiness?: yes    Reviewed and updated as needed this visit by clinical staff   Tobacco  Allergies  Meds  Problems  Med Hx  Surg Hx  Fam Hx  Soc   Hx          Reviewed and updated as needed this visit by Provider   Tobacco  Allergies  Meds  Problems  Med Hx  Surg Hx  Fam Hx           Social History     Tobacco Use     Smoking status: Former Smoker     Packs/day: 0.25     Years: 20.00     Pack years: 5.00     Types: Cigarettes     Start date: 1980     Quit date: 2015     Years since quittin.5     Smokeless tobacco: Never Used     Tobacco comment: it was occasional and usually when I was around smokers.   Substance Use Topics     Alcohol use: Not Currently     Comment: I recently stopped.     If you drink alcohol do you typically have >3 drinks per day or >7 drinks per week? Yes      No flowsheet data found.            Current providers sharing in care for this patient include:   Patient Care Team:  Carolin Carmichael MD as PCP - General (Family Medicine)  Carolin Carmichael MD as Assigned PCP    The following health maintenance items are reviewed in Epic and correct as of today:  Health Maintenance Due   Topic Date Due     DEXA  Never done     MAMMO SCREENING  Never done     LUNG CANCER SCREENING  Never done     COVID-19 Vaccine (3 - Booster for Alison series) 2022     EYE EXAM  2022     INFLUENZA VACCINE (1) 2022     BP Readings from Last 3 Encounters:   22 138/78   21 137/85   20 122/69    Wt Readings from Last 3 Encounters:   22 116.8 kg (257 lb 9.6 oz)  "  04/20/22 121.3 kg (267 lb 8 oz)   02/23/21 119.3 kg (263 lb)          intentional weight loss- as not drinking at all, enjoys gardening. Watches Diabetes  diet        Pneumonia Vaccine:  Mammogram Screening: Mammogram Screening: Recommended mammography every 1-2 years with patient discussion and risk factor consideration    Breast CA Risk Assessment (FHS-7) 2/22/2021   Do you have a family history of breast, colon, or ovarian cancer? No / Unknown       Pertinent mammograms are reviewed under the imaging tab.    Review of Systems   Constitutional: Negative for chills and fever.   HENT: Negative for congestion, ear pain, hearing loss and sore throat.    Eyes: Negative for pain and visual disturbance.   Respiratory: Negative for cough and shortness of breath.    Cardiovascular: Negative for chest pain, palpitations and peripheral edema.   Gastrointestinal: Negative for abdominal pain, constipation, diarrhea, heartburn, hematochezia and nausea.   Breasts:  Negative for tenderness, breast mass and discharge.   Genitourinary: Negative for dysuria, frequency, genital sores, hematuria, pelvic pain, urgency, vaginal bleeding and vaginal discharge.   Musculoskeletal: Negative for arthralgias, joint swelling and myalgias.   Skin: Negative for rash.   Neurological: Negative for dizziness, weakness, headaches and paresthesias.   Psychiatric/Behavioral: Negative for mood changes. The patient is not nervous/anxious.          OBJECTIVE:   Pulse 110   Temp 97.5  F (36.4  C) (Temporal)   Resp 18   Ht 1.702 m (5' 7\")   Wt 116.8 kg (257 lb 9.6 oz)   SpO2 96%   BMI 40.35 kg/m   Estimated body mass index is 40.35 kg/m  as calculated from the following:    Height as of this encounter: 1.702 m (5' 7\").    Weight as of this encounter: 116.8 kg (257 lb 9.6 oz).  Physical Exam  GENERAL APPEARANCE: healthy, alert and no distress  EYES: Eyes grossly normal to inspection, PERRL and conjunctivae and sclerae normal  HENT: ear canals and " TM's normal, nose and mouth without ulcers or lesions, oropharynx clear and oral mucous membranes moist  NECK: no adenopathy, no asymmetry, masses, or scars and thyroid normal to palpation  RESP: lungs clear to auscultation - no rales, rhonchi or wheezes  BREAST: normal without masses, tenderness or nipple discharge and no palpable axillary masses or adenopathy  CV: regular rate and rhythm, normal S1 S2, no S3 or S4, no murmur, click or rub, no peripheral edema and peripheral pulses strong  ABDOMEN: soft, nontender, no hepatosplenomegaly, no masses and bowel sounds normal  MS: no musculoskeletal defects are noted and gait is age appropriate without ataxia  SKIN: no suspicious lesions or rashes  NEURO: Normal strength and tone, sensory exam grossly normal, mentation intact and speech normal  DIABETIC FOOT EXAM: normal DP and PT pulses, no trophic changes or ulcerative lesions, normal sensory exam and normal monofilament exam, except for findings noted on diabetic foot graphical image.            PSYCH: mentation appears normal and affect normal/bright    Diagnostic Test Results:  Labs reviewed in Epic  Results for orders placed or performed in visit on 09/06/22 (from the past 24 hour(s))   Hemoglobin A1c   Result Value Ref Range    Hemoglobin A1C 5.4 0.0 - 5.6 %       ASSESSMENT / PLAN:   (Z00.00) Routine general medical examination at a health care facility  (primary encounter diagnosis)  Comment: Plan: REVIEW OF HEALTH MAINTENANCE PROTOCOL ORDERS    Pap not indicated.    Mammogram advised annually, referral is given.    Colon cancer screening up-to-date, Negative COVID March 2021.  Repeat March 2024 and 3 years.    Patient quit smoking in 2015.  She has only 5 pack years of nicotine inhalation.  She is not eligible for lung cancer screening with low-dose CT scan.      (E11.9,  Z79.4) Type 2 diabetes mellitus without complication, with long-term current use of insulin (H)  Comment: will call to make eye appointment.  did not like metformin  Plan: Adult Eye  Referral,  Diabetes seem to be well controlled.  My plan is to keep tapering Lantus down with the help of MTM.   Tapering Lantus will be a little bit challenging because she does not like to check her SMBG.  She denies any hypoglycemia.      Refill is given insulin glargine, she uses currently 36 units at night.        (LANTUS VIAL) 100 UNIT/ML vial,   EKG 12-lead      complete w/read - Clinics, Hemoglobin A1c, 5.4        Basic metabolic panel  -pending.    Based on her most recent LDL of 94 discussed benefit of statin, and she is willing to start.  , rosuvastatin (CRESTOR) 10 MG      tablet  Started today.  Recheck fasting lipid in 3 to 6 months.    (E11.42) Diabetic polyneuropathy associated with type 2 diabetes mellitus (H)  Comment: left tingling more  Plan: amitriptyline (ELAVIL) 10 MG tablet, at bedtime helps.  Refill given.       (E78.5) Hyperlipidemia LDL goal <70  Comment: LDL goal is less than 70.  Congratulated her on her intentional weight loss with clean eating habits.  Plan: rosuvastatin (CRESTOR) 10 MG tablet          (R53.81) Physical deconditioning  Comment: I did do EKG that was her first EKG without any acute changes.  I have encouraged her to move slowly but surely.  I think she does have significant shortness of breath in the is the reason of her tachycardia.  EKG shows sinus tachycardia.  I do recommend to proceed with echocardiogram as well.  Plan: Echocardiogram Complete         (Z12.31) Visit for screening mammogram  Comment: Plan: MA SCREENING DIGITAL BILAT - Future  (s+30)            (Z78.0) History of menopause  Comment: Plan: DEXA HIP/PELVIS/SPINE - Future            (Z23) Need for vaccination  Comment: Plan: ZOSTER VACCINE RECOMBINANT ADJUVANTED         (SHINGRIX), TDAP VACCINE (Adacel, Boostrix),         Pneumococcal 20 Valent Conjugate (PCV20)            (R00.0) Sinus tachycardia  Comment: Plan: EKG 12-lead complete w/read - Clinics,  "        Echocardiogram Complete            (F12.90) Marijuana smoker  Comment: daily    Plan:       Patient has been advised of split billing requirements and indicates understanding: Yes    COUNSELING:  Reviewed preventive health counseling, as reflected in patient instructions       Regular exercise       Healthy diet/nutrition       Vision screening       Hearing screening       Dental care       Bladder control       Fall risk prevention       Osteoporosis prevention/bone health    Estimated body mass index is 40.35 kg/m  as calculated from the following:    Height as of this encounter: 1.702 m (5' 7\").    Weight as of this encounter: 116.8 kg (257 lb 9.6 oz).    Weight management plan: Discussed healthy diet and exercise guidelines    She reports that she quit smoking about 7 years ago. Her smoking use included cigarettes. She started smoking about 42 years ago. She has a 5.00 pack-year smoking history. She has never used smokeless tobacco.      Appropriate preventive services were discussed with this patient, including applicable screening as appropriate for cardiovascular disease, diabetes, osteopenia/osteoporosis, and glaucoma.  As appropriate for age/gender, discussed screening for colorectal cancer, prostate cancer, breast cancer, and cervical cancer. Checklist reviewing preventive services available has been given to the patient.    Reviewed patients plan of care and provided an AVS. The Basic Care Plan (routine screening as documented in Health Maintenance) for Evette meets the Care Plan requirement. This Care Plan has been established and reviewed with the Patient.    Counseling Resources:  ATP IV Guidelines  Pooled Cohorts Equation Calculator  Breast Cancer Risk Calculator  Breast Cancer: Medication to Reduce Risk  FRAX Risk Assessment  ICSI Preventive Guidelines  Dietary Guidelines for Americans, 2010  USDA's MyPlate  ASA Prophylaxis  Lung CA Screening    Carolin Carmichael MD  [facility] " [place]    Identified Health Risks:

## 2022-09-06 NOTE — PATIENT INSTRUCTIONS
Call to schedule your imaging:    [facility] [phone]    [facility] [phone]    Patient Education   Personalized Prevention Plan  You are due for the preventive services outlined below.  Your care team is available to assist you in scheduling these services.  If you have already completed any of these items, please share that information with your care team to update in your medical record.  Health Maintenance Due   Topic Date Due    Osteoporosis Screening  Never done    ANNUAL REVIEW OF HM ORDERS  Never done    Mammogram  Never done    LUNG CANCER SCREENING  Never done    Zoster (Shingles) Vaccine (2 of 3) 02/08/2016    Diptheria Tetanus Pertussis (DTAP/TDAP/TD) Vaccine (2 - Td or Tdap) 03/18/2020    Pneumococcal Vaccine (2 - PCV) 02/23/2022    COVID-19 Vaccine (3 - Booster for Alison series) 02/26/2022    Eye Exam  06/24/2022    Flu Vaccine (1) 09/01/2022       Understanding USDA MyPlate  The [employer] has guidelines to help you make healthy food choices. These are called MyPlate. MyPlate shows the food groups that make up healthy meals using the image of a place setting. Before you eat, think about the healthiest choices for what to put on your plate or in your cup or bowl. To learn more about building a healthy plate, visit www.choosemyplate.gov.    The food groups  Fruits. Any fruit or 100% fruit juice counts as part of the Fruit Group. Fruits may be fresh, canned, frozen, or dried, and may be whole, cut-up, or pureed. Make 1/2 of your plate fruits and vegetables.  Vegetables. Any vegetable or 100% vegetable juice counts as a member of the Vegetable Group. Vegetables may be fresh, frozen, canned, or dried. They can be served raw or cooked and may be whole, cut-up, or mashed. Make 1/2 of your plate fruits and vegetables.  Grains. All foods made from grains are part of the Grains Group. These include wheat, rice, oats, cornmeal, and barley. Grains are often used to make foods such as  bread, pasta, oatmeal, cereal, tortillas, and grits. Grains should be no more than 1/4 of your plate. At least half of your grains should be whole grains.  Protein. This group includes meat, poultry, seafood, beans and peas, eggs, processed soy products (such as tofu), nuts (including nut butters), and seeds. Make protein choices no more than 1/4 of your plate. Meat and poultry choices should be lean or low fat.  Dairy. The Dairy Group includes all fluid milk products and foods made from milk that contain calcium, such as yogurt and cheese. (Foods that have little calcium, such as cream, butter, and cream cheese, are not part of this group.) Most dairy choices should be low-fat or fat-free.  Oils. Oils aren't a food group, but they do contain essential nutrients. However it's important to watch your intake of oils. These are fats that are liquid at room temperature. They include canola, corn, olive, soybean, vegetable, and sunflower oil. Foods that are mainly oil include mayonnaise, certain salad dressings, and soft margarines. You likely already get your daily oil allowance from the foods you eat.  Things to limit  Eating healthy also means limiting these things in your diet:     Salt (sodium). Many processed foods have a lot of sodium. To keep sodium intake down, eat fresh vegetables, meats, poultry, and seafood when possible. Purchase low-sodium, reduced-sodium, or no-salt-added food products at the store. And don't add salt to your meals at home. Instead, season them with herbs and spices such as dill, oregano, cumin, and paprika. Or try adding flavor with lemon or lime zest and juice.  Saturated fat. Saturated fats are most often found in animal products such as beef, pork, and chicken. They are often solid at room temperature, such as butter. To reduce your saturated fat intake, choose leaner cuts of meat and poultry. And try healthier cooking methods such as grilling, broiling, roasting, or baking. For a  simple lower-fat swap, use plain nonfat yogurt instead of mayonnaise when making potato salad or macaroni salad.  Added sugars. These are sugars added to foods. They are in foods such as ice cream, candy, soda, fruit drinks, sports drinks, energy drinks, cookies, pastries, jams, and syrups. Cut down on added sugars by sharing sweet treats with a family member or friend. You can also choose fruit for dessert, and drink water or other unsweetened beverages.     [employer] last reviewed this educational content on 6/1/2020 2000-2021 [employer]. All rights reserved. This information is not intended as a substitute for professional medical care. Always follow your healthcare professional's instructions.

## 2022-09-07 DIAGNOSIS — E11.9 TYPE 2 DIABETES MELLITUS WITHOUT COMPLICATION, WITH LONG-TERM CURRENT USE OF INSULIN (H): ICD-10-CM

## 2022-09-07 DIAGNOSIS — Z79.4 TYPE 2 DIABETES MELLITUS WITHOUT COMPLICATION, WITH LONG-TERM CURRENT USE OF INSULIN (H): ICD-10-CM

## 2022-09-07 LAB
ANION GAP SERPL CALCULATED.3IONS-SCNC: 8 MMOL/L (ref 3–14)
BUN SERPL-MCNC: 9 MG/DL (ref 7–30)
CALCIUM SERPL-MCNC: 9.2 MG/DL (ref 8.5–10.1)
CHLORIDE BLD-SCNC: 109 MMOL/L (ref 94–109)
CO2 SERPL-SCNC: 26 MMOL/L (ref 20–32)
CREAT SERPL-MCNC: 0.76 MG/DL (ref 0.52–1.04)
GFR SERPL CREATININE-BSD FRML MDRD: 85 ML/MIN/1.73M2
GLUCOSE BLD-MCNC: 128 MG/DL (ref 70–99)
LDLC SERPL CALC-MCNC: 108 MG/DL
POTASSIUM BLD-SCNC: 4.3 MMOL/L (ref 3.4–5.3)
SODIUM SERPL-SCNC: 143 MMOL/L (ref 133–144)

## 2022-09-07 RX ORDER — SYRINGE-NEEDLE,INSULIN,0.5 ML 27GX1/2"
SYRINGE, EMPTY DISPOSABLE MISCELLANEOUS
Qty: 90 EACH | Refills: 0 | Status: CANCELLED | OUTPATIENT
Start: 2022-09-07

## 2022-09-07 NOTE — TELEPHONE ENCOUNTER
Called [facility].  States they did receive but too soon to refill.  Last refill was 9/2 for #30.    Patient informed.  Emily Bennett RN

## 2022-09-07 NOTE — TELEPHONE ENCOUNTER
Patient reports amitriptyline  was not sent to pharmacy yesterday. Hoping for us to resend it.   Patient does not need insulin until March 2023.   Please advise.   Thanks!  Mary LAWSON

## 2022-10-16 ENCOUNTER — HEALTH MAINTENANCE LETTER (OUTPATIENT)
Age: 67
End: 2022-10-16

## 2023-03-26 ENCOUNTER — HEALTH MAINTENANCE LETTER (OUTPATIENT)
Age: 68
End: 2023-03-26

## 2023-08-07 ENCOUNTER — PATIENT OUTREACH (OUTPATIENT)
Dept: CARE COORDINATION | Facility: CLINIC | Age: 68
End: 2023-08-07
Payer: COMMERCIAL

## 2023-08-21 ENCOUNTER — PATIENT OUTREACH (OUTPATIENT)
Dept: CARE COORDINATION | Facility: CLINIC | Age: 68
End: 2023-08-21
Payer: COMMERCIAL

## 2023-11-04 ENCOUNTER — HEALTH MAINTENANCE LETTER (OUTPATIENT)
Age: 68
End: 2023-11-04

## 2023-12-14 ENCOUNTER — TELEPHONE (OUTPATIENT)
Dept: FAMILY MEDICINE | Facility: CLINIC | Age: 68
End: 2023-12-14

## 2023-12-14 ENCOUNTER — OFFICE VISIT (OUTPATIENT)
Dept: FAMILY MEDICINE | Facility: CLINIC | Age: 68
End: 2023-12-14
Payer: COMMERCIAL

## 2023-12-14 VITALS
DIASTOLIC BLOOD PRESSURE: 72 MMHG | TEMPERATURE: 98.1 F | HEART RATE: 82 BPM | BODY MASS INDEX: 40.95 KG/M2 | OXYGEN SATURATION: 97 % | HEIGHT: 67 IN | RESPIRATION RATE: 20 BRPM | WEIGHT: 260.9 LBS | SYSTOLIC BLOOD PRESSURE: 128 MMHG

## 2023-12-14 DIAGNOSIS — E78.5 HYPERLIPIDEMIA LDL GOAL <70: ICD-10-CM

## 2023-12-14 DIAGNOSIS — E66.813 CLASS 3 OBESITY: ICD-10-CM

## 2023-12-14 DIAGNOSIS — Z79.4 TYPE 2 DIABETES MELLITUS WITHOUT COMPLICATION, WITH LONG-TERM CURRENT USE OF INSULIN (H): ICD-10-CM

## 2023-12-14 DIAGNOSIS — R53.81 PHYSICAL DECONDITIONING: ICD-10-CM

## 2023-12-14 DIAGNOSIS — E11.9 TYPE 2 DIABETES MELLITUS WITHOUT COMPLICATION, WITH LONG-TERM CURRENT USE OF INSULIN (H): ICD-10-CM

## 2023-12-14 DIAGNOSIS — Z02.89 ENCOUNTER FOR COMPLETION OF FORM WITH PATIENT: ICD-10-CM

## 2023-12-14 LAB
ALBUMIN SERPL BCG-MCNC: 3.8 G/DL (ref 3.5–5.2)
ALP SERPL-CCNC: 110 U/L (ref 40–150)
ALT SERPL W P-5'-P-CCNC: 20 U/L (ref 0–50)
ANION GAP SERPL CALCULATED.3IONS-SCNC: 10 MMOL/L (ref 7–15)
AST SERPL W P-5'-P-CCNC: 24 U/L (ref 0–45)
BILIRUB SERPL-MCNC: 0.4 MG/DL
BUN SERPL-MCNC: 8.5 MG/DL (ref 8–23)
CALCIUM SERPL-MCNC: 10.2 MG/DL (ref 8.8–10.2)
CHLORIDE SERPL-SCNC: 105 MMOL/L (ref 98–107)
CHOLEST SERPL-MCNC: 188 MG/DL
CREAT SERPL-MCNC: 0.73 MG/DL (ref 0.51–0.95)
CREAT UR-MCNC: 66.6 MG/DL
DEPRECATED HCO3 PLAS-SCNC: 27 MMOL/L (ref 22–29)
EGFRCR SERPLBLD CKD-EPI 2021: 89 ML/MIN/1.73M2
ERYTHROCYTE [DISTWIDTH] IN BLOOD BY AUTOMATED COUNT: 12.3 % (ref 10–15)
FASTING STATUS PATIENT QL REPORTED: YES
GLUCOSE SERPL-MCNC: 225 MG/DL (ref 70–99)
HBA1C MFR BLD: 7.8 % (ref 0–5.6)
HCT VFR BLD AUTO: 40.9 % (ref 35–47)
HDLC SERPL-MCNC: 55 MG/DL
HGB BLD-MCNC: 13.6 G/DL (ref 11.7–15.7)
LDLC SERPL CALC-MCNC: 106 MG/DL
MCH RBC QN AUTO: 31.2 PG (ref 26.5–33)
MCHC RBC AUTO-ENTMCNC: 33.3 G/DL (ref 31.5–36.5)
MCV RBC AUTO: 94 FL (ref 78–100)
MICROALBUMIN UR-MCNC: 15.6 MG/L
MICROALBUMIN/CREAT UR: 23.42 MG/G CR (ref 0–25)
NONHDLC SERPL-MCNC: 133 MG/DL
PLATELET # BLD AUTO: 185 10E3/UL (ref 150–450)
POTASSIUM SERPL-SCNC: 4.2 MMOL/L (ref 3.4–5.3)
PROT SERPL-MCNC: 6.5 G/DL (ref 6.4–8.3)
RBC # BLD AUTO: 4.36 10E6/UL (ref 3.8–5.2)
SODIUM SERPL-SCNC: 142 MMOL/L (ref 135–145)
TRIGL SERPL-MCNC: 133 MG/DL
TSH SERPL DL<=0.005 MIU/L-ACNC: 3.18 UIU/ML (ref 0.3–4.2)
WBC # BLD AUTO: 5.5 10E3/UL (ref 4–11)

## 2023-12-14 PROCEDURE — 36415 COLL VENOUS BLD VENIPUNCTURE: CPT | Performed by: FAMILY MEDICINE

## 2023-12-14 PROCEDURE — 80053 COMPREHEN METABOLIC PANEL: CPT | Performed by: FAMILY MEDICINE

## 2023-12-14 PROCEDURE — 82570 ASSAY OF URINE CREATININE: CPT | Performed by: FAMILY MEDICINE

## 2023-12-14 PROCEDURE — 85027 COMPLETE CBC AUTOMATED: CPT | Performed by: FAMILY MEDICINE

## 2023-12-14 PROCEDURE — 84443 ASSAY THYROID STIM HORMONE: CPT | Performed by: FAMILY MEDICINE

## 2023-12-14 PROCEDURE — 99214 OFFICE O/P EST MOD 30 MIN: CPT | Performed by: FAMILY MEDICINE

## 2023-12-14 PROCEDURE — 83036 HEMOGLOBIN GLYCOSYLATED A1C: CPT | Performed by: FAMILY MEDICINE

## 2023-12-14 PROCEDURE — 80061 LIPID PANEL: CPT | Performed by: FAMILY MEDICINE

## 2023-12-14 PROCEDURE — 82043 UR ALBUMIN QUANTITATIVE: CPT | Performed by: FAMILY MEDICINE

## 2023-12-14 RX ORDER — SYRINGE-NEEDLE,INSULIN,0.5 ML 27GX1/2"
SYRINGE, EMPTY DISPOSABLE MISCELLANEOUS
Qty: 90 EACH | Refills: 1 | Status: SHIPPED | OUTPATIENT
Start: 2023-12-14 | End: 2024-08-14

## 2023-12-14 RX ORDER — RESPIRATORY SYNCYTIAL VIRUS VACCINE 120MCG/0.5
0.5 KIT INTRAMUSCULAR ONCE
Qty: 1 EACH | Refills: 0 | Status: CANCELLED | OUTPATIENT
Start: 2023-12-14 | End: 2023-12-14

## 2023-12-14 RX ORDER — INSULIN GLARGINE 100 [IU]/ML
12 INJECTION, SOLUTION SUBCUTANEOUS AT BEDTIME
Qty: 10.8 ML | Refills: 1 | Status: SHIPPED | OUTPATIENT
Start: 2023-12-14 | End: 2023-12-22

## 2023-12-14 ASSESSMENT — PAIN SCALES - GENERAL: PAINLEVEL: NO PAIN (0)

## 2023-12-14 NOTE — PROGRESS NOTES
"  Assessment & Plan     Type 2 diabetes mellitus without complication, with long-term current use of insulin   -   Lab Results   Component Value Date    A1C 7.8 12/14/2023    A1C 5.4 09/06/2022    A1C 5.4 04/20/2022    A1C 5.9 02/23/2021    A1C 5.3 05/21/2019    A1C 6.0 01/22/2018    A1C 5.7 01/03/2017    A1C 6.2 12/14/2015    - Discussed elevated hgba1c. She is interested in restarting Lantus, didn't tolerate oral medications. We discussed starting lower dose and titration as indicated. Follow up in 3 months.   - Hemoglobin A1c; Future  - Albumin Random Urine Quantitative with Creat Ratio; Future  - CBC with platelets; Future  - Comprehensive metabolic panel (BMP + Alb, Alk Phos, ALT, AST, Total. Bili, TP); Future  - TSH with free T4 reflex; Future  - Hemoglobin A1c  - Albumin Random Urine Quantitative with Creat Ratio  - CBC with platelets  - Comprehensive metabolic panel (BMP + Alb, Alk Phos, ALT, AST, Total. Bili, TP)  - TSH with free T4 reflex  - insulin glargine (LANTUS VIAL) 100 UNIT/ML vial; Inject 12 Units Subcutaneous at bedtime for 180 days INJECT 36 UNITS UNDER THE SKIN EVERY NIGHT AT BEDTIME  Dispense: 10.8 mL; Refill: 1  - insulin syringe-needle U-100 (BD INSULIN SYRINGE U/F) 31G X 5/16\" 0.5 ML miscellaneous; USE ONE SYRINGE ONCE DAILY OR AS DIRECTED  Dispense: 90 each; Refill: 1  - PRIMARY CARE FOLLOW-UP SCHEDULING; Future      Class 3 obesity   - metromobility forms filled and scanned into chart   - PRIMARY CARE FOLLOW-UP SCHEDULING; Future    Hyperlipidemia LDL goal <70  - Lipid panel reflex to direct LDL Fasting; Future  - Lipid panel reflex to direct LDL Fasting  - PRIMARY CARE FOLLOW-UP SCHEDULING; Future    Physical deconditioning  - wants to start going to gym   - PRIMARY CARE FOLLOW-UP SCHEDULING; Future    Encounter for completion of form with patient      Patient Instructions   Diabetes -   Please restart Lantus at 12 U at night and start checking your fasting blood sugars.   In one week if " your fasting blood sugars are not less than 130, then please increase by 3 U every week until you get to Lantus 20 U at night.       Frances Albert MD  [facility]    Phil Alas is a 68 year old, presenting for the following health issues:  Diabetes (Follow up )      12/14/2023     9:53 AM   Additional Questions   Roomed by Ivis   Accompanied by alone         12/14/2023     9:53 AM   Patient Reported Additional Medications   Patient reports taking the following new medications none       History of Present Illness       Diabetes:   She presents for follow up of diabetes.    She is not checking blood glucose.         She has no concerns regarding her diabetes at this time.   She is not experiencing numbness or burning in feet, excessive thirst, blurry vision, weight changes or redness, sores or blisters on feet. The patient has not had a diabetic eye exam in the last 12 months.          She eats 2-3 servings of fruits and vegetables daily.She consumes 1 sweetened beverage(s) daily.She exercises with enough effort to increase her heart rate 10 to 19 minutes per day.  She exercises with enough effort to increase her heart rate 3 or less days per week.   She is taking medications regularly.     One year ago stopped lantus 20 U at night. She was a pretty heavy drinking for awhile. When she stopped drinking she had sweet craving. She took less insulin and didn't have a sweet tooth. Stopped drinking all of 2022. This year she has been drinking socially with her neighbors. She does have some increase urination but drinking more cinnamon tea. No increased thirst, vision changes or neuropathy. Metformin didn't tolerate - tingling in her arms. Glipizide side effect nausea. She was taking Lantus 20 U at bedtime.     She needs metro mobility forms filled. She is unable to walk more than 1/2 mile without stopping due to fatigue. She is using a cane. No joint pain. No shortness of breath or  "chest pain. She is hoping to restart activity and go to the gym.     Review of Systems         Objective    /72 (BP Location: Right arm, Patient Position: Sitting, Cuff Size: Adult Large)   Pulse 82   Temp 98.1  F (36.7  C) (Temporal)   Resp 20   Ht 1.69 m (5' 6.54\")   Wt 118.3 kg (260 lb 14.4 oz)   SpO2 97%   BMI 41.44 kg/m    Body mass index is 41.44 kg/m .  Physical Exam                         "

## 2023-12-14 NOTE — COMMUNITY RESOURCES LIST (ENGLISH)
12/14/2023   [facility]  N/A  For questions about this resource list or additional care needs, please contact your primary care clinic or care manager.  Phone: [phone]   Email: N/A   Address: [facility] [place] [address]   Hours: N/A        Transportation       Free or low-cost transportation  1  [facility] Distance: 0.85 miles      In-Person   [address] [place] [address]  Language: English  Hours: Mon - Fri 8:00 AM - 3:00 PM  Fees: Free   Phone: (144) 730-5019 Ext.14 Email: [email or web address] Website: [email or web address] [email or web address]     2  [facility] Distance: 0.99 miles      In-Person   [address] [place] [address]  Language: English  Hours: Mon - Fri 9:00 AM - 12:00 PM , Mon - Fri 1:00 PM - 3:00 PM  Fees: Self Pay   Phone: (746) 574-7454 Email: [email or web address] Website: [email or web address]     Transportation to medical appointments  3  [facility] Distance: 3.95 miles      In-Person   [address] [place] [address]  Language: English  Hours: Mon - Fri 9:00 AM - 4:00 PM  Fees: Free   Phone: (682) 503-2756 Email: [email or web address] [email or web address] Website: [email or web address] HospitalHeart [email or web address] [email or web address]     4  Assisted Transport Distance: 6.5 miles      In-Person   [address] [place] [address]  Language: English, [place]  Hours: Mon - Sun Appt. Only  Fees: Self Pay   Phone: (554) 568-9211 Email: [email or web address]@[employer] Website: http://[email or web address]/          Important Numbers & Websites       Emergency Services   911  City Services   311  Poison Control   (552) 991-6459  Suicide Prevention Lifeline   (999) 819-4697 (TALK)  Child Abuse Hotline   (983) 660-8317 (4-A-Child)  Sexual Assault Hotline   (210) 504-9454 (HOPE)  National Runaway Safeline   (876) 939-1056 (RUNAWAY)  All-Options Talkline   (807)  843-7220  Substance Abuse Referral   (362) 559-4741 (HELP)

## 2023-12-14 NOTE — PATIENT INSTRUCTIONS
Diabetes -   Please restart Lantus at 12 U at night and start checking your fasting blood sugars.   In one week if your fasting blood sugars are not less than 130, then please increase by 3 U every week until you get to Lantus 20 U at night.        Message from [employer]:  Original authorizing provider: HARJEET Nails PATRICIA Moody would like a refill of the following medications:  amphetamine-dextroamphetamine (ADDERALL XR) 30 MG 24 hr capsule [Oma Matthew NP]    Preferred pharmacy: [facility] in [place] on [facility]    Comment:

## 2023-12-14 NOTE — COMMUNITY RESOURCES LIST (ENGLISH)
12/14/2023   [facility]  N/A  For additional resource needs, please contact your health insurance member services or your primary care team.  Phone: [phone]   Email: N/A   Address: [facility] [place] [address]   Hours: N/A        Transportation       Free or low-cost transportation  1  [facility] Distance: 0.85 miles      In-Person   [address] [place] [address]  Language: English  Hours: Mon - Fri 8:00 AM - 3:00 PM  Fees: Free   Phone: (171) 837-6055 Ext.14 Email: [email or web address] Website: http://www.[facility].org     2  [facility] [place] Distance: 0.99 miles      In-Person   [address] [place] [address]  Language: English  Hours: Mon - Fri 9:00 AM - 12:00 PM , Mon - Fri 1:00 PM - 3:00 PM  Fees: Self Pay   Phone: (748) 410-5974 Email: info@[email or web address].org Website: https://www.[facility] Hospitalwi.org/[place]     Transportation to medical appointments  3  [facility] Distance: 3.95 miles      In-Person   [address] [place] [address]  Language: English  Hours: Mon - Fri 9:00 AM - 4:00 PM  Fees: Free   Phone: (599) 218-7082 Email: [facility] Memorial Hospitalnetworkforseniors@[employer] Website: http://www.[facility] NetworkRVR Systems.org/     4  [facility] Distance: 6.5 miles      In-Person   [address] [facility] [place] [address]  Language: English, Montenegrin  Hours: Mon - Sun Appt. Only  Fees: Self Pay   Phone: (679) 631-9720 Email: [email or web address]@[employer] Website: http://www.[employer].com/          Important Numbers & Websites       [address].org  Poison Control   (663) 186-9115 [place]poison.[place]  Suicide and Crisis Lifeline   988 988UV[facility] Health University Hospitalline.org  Childhelp National Child Abuse Hotline   748.123.6578 Childhelphotline.org  National Sexual Assault Hotline   (471) 775-4051 (HOPE) Rainn.org  National Runaway Safeline   (580) 262-5477  (RUNAWAY) 1800Nor-Lea General Hospitalway.org  Pregnancy & Postpartum Support [place]   Call/text 201-833-6402 Ppsupportmn.org  Substance Abuse National Helpline ([facility]   254-135-HELP (5693) Findtreatment.gov  Emergency Services   910

## 2023-12-21 ENCOUNTER — VIRTUAL VISIT (OUTPATIENT)
Dept: FAMILY MEDICINE | Facility: CLINIC | Age: 68
End: 2023-12-21
Payer: COMMERCIAL

## 2023-12-21 DIAGNOSIS — M25.511 RIGHT SHOULDER PAIN, UNSPECIFIED CHRONICITY: ICD-10-CM

## 2023-12-21 DIAGNOSIS — Z00.00 ENCOUNTER FOR MEDICARE ANNUAL WELLNESS EXAM: Primary | ICD-10-CM

## 2023-12-21 DIAGNOSIS — Z12.31 VISIT FOR SCREENING MAMMOGRAM: ICD-10-CM

## 2023-12-21 DIAGNOSIS — Z78.0 ASYMPTOMATIC MENOPAUSE: ICD-10-CM

## 2023-12-21 PROCEDURE — G0439 PPPS, SUBSEQ VISIT: HCPCS | Mod: VID | Performed by: FAMILY MEDICINE

## 2023-12-21 ASSESSMENT — ACTIVITIES OF DAILY LIVING (ADL)
VISION_MANAGEMENT: GLASSES
WEAR_GLASSES_OR_BLIND: YES
FALL_HISTORY_WITHIN_LAST_SIX_MONTHS: NO
TOILETING_ISSUES: NO
DIFFICULTY_EATING/SWALLOWING: NO
EQUIPMENT_CURRENTLY_USED_AT_HOME: GRAB BAR, TUB/SHOWER
CURRENT_FUNCTION: NO ASSISTANCE NEEDED
CONCENTRATING,_REMEMBERING_OR_MAKING_DECISIONS_DIFFICULTY: NO
DIFFICULTY_COMMUNICATING: NO
DOING_ERRANDS_INDEPENDENTLY_DIFFICULTY: YES
WALKING_OR_CLIMBING_STAIRS_DIFFICULTY: YES
HEARING_DIFFICULTY_OR_DEAF: NO
DRESSING/BATHING_DIFFICULTY: NO
CHANGE_IN_FUNCTIONAL_STATUS_SINCE_ONSET_OF_CURRENT_ILLNESS/INJURY: NO
WALKING_OR_CLIMBING_STAIRS: STAIR CLIMBING DIFFICULTY, REQUIRES EQUIPMENT

## 2023-12-21 ASSESSMENT — ENCOUNTER SYMPTOMS
FREQUENCY: 1
EYE PAIN: 0
JOINT SWELLING: 0
DIARRHEA: 0
SORE THROAT: 0
CHILLS: 0
NAUSEA: 0
PALPITATIONS: 0
PARESTHESIAS: 0
ABDOMINAL PAIN: 0
COUGH: 0
ARTHRALGIAS: 1
DIZZINESS: 0
BREAST MASS: 0
WEAKNESS: 0
DYSURIA: 0
HEMATURIA: 0
FEVER: 0
NERVOUS/ANXIOUS: 0
SHORTNESS OF BREATH: 0
MYALGIAS: 0
CONSTIPATION: 0
HEARTBURN: 0
HEADACHES: 0
HEMATOCHEZIA: 0

## 2023-12-21 NOTE — PATIENT INSTRUCTIONS
Patient Education   Personalized Prevention Plan  You are due for the preventive services outlined below.  Your care team is available to assist you in scheduling these services.  If you have already completed any of these items, please share that information with your care team to update in your medical record.  Health Maintenance Due   Topic Date Due     Osteoporosis Screening  Never done     Mammogram  Never done     LUNG CANCER SCREENING  Never done     RSV VACCINE (Pregnancy & 60+) (1 - 1-dose 60+ series) Never done     Eye Exam  06/24/2022     Zoster (Shingles) Vaccine (3 of 3) 11/01/2022     Flu Vaccine (1) 09/01/2023     COVID-19 Vaccine (4 - 2023-24 season) 09/01/2023     Diabetic Foot Exam  09/06/2023     ANNUAL REVIEW OF HM ORDERS  09/06/2023

## 2023-12-21 NOTE — COMMUNITY RESOURCES LIST (ENGLISH)
12/21/2023   [facility]  N/A  For questions about this resource list or additional care needs, please contact your primary care clinic or care manager.  Phone: [phone]   Email: N/A   Address: [facility] [place] [address]   Hours: N/A        Transportation       Free or low-cost transportation  1  [facility] Distance: 0.85 miles      In-Person   [address] [place] [address]  Language: English  Hours: Mon - Fri 8:00 AM - 3:00 PM  Fees: Free   Phone: (339) 479-7819 Ext.14 Email: neighborhood@[facility] Website: http://www.[facility] CenterKinnser SoftwareSt. [facility] Hospital.org     2  [facility] Distance: 0.99 miles      In-Person   [address] [place] [address]  Language: English  Hours: Mon - Fri 9:00 AM - 12:00 PM , Mon - Fri 1:00 PM - 3:00 PM  Fees: Self Pay   Phone: (336) 987-4057 Email: info@[employer].org Website: https://www.[facility] InspectorBanner Baywood Medical Centerwi.org/[place]     Transportation to medical appointments  3  [facility] Distance: 3.95 miles      In-Person   [address] [place] [address]  Language: English  Hours: Mon - Fri 9:00 AM - 4:00 PM  Fees: Free   Phone: (823) 217-8951 Email: [facility] - Westnetworkforseniors@[employer] Website: http://www.[facility] - WestLugIron SoftwareNorthern Light Sebasticook Valley HospitalMagicRooms Solutions India (P)Ltd..org/     4  Assisted [facility] Distance: 6.5 miles      In-Person   [address] [facility] [address]  Language: English, Gabonese  Hours: Mon - Sun Appt. Only  Fees: Self Pay   Phone: (292) 435-1333 Email: roseann@[employer] Website: http://www.[employer]/          Important Numbers & Websites       Emergency Services   911  City Services   311  Poison Control   (593) 416-3764  Suicide Prevention Lifeline   (113) 805-5575 (TALK)  Child Abuse Hotline   (926) 623-8804 (4-A-Child)  Sexual Assault Hotline   (538) 887-4219 (HOPE)  National Runaway Safeline   (691) 483-9725 (RUNAWAY)  All-Options Talkline   (712)  180-0017  Substance Abuse Referral   (384) 336-3755 (HELP)

## 2023-12-21 NOTE — PROGRESS NOTES
"SUBJECTIVE:   Evette is a 68 year old, presenting for the following:  Wellness Visit        12/21/2023     9:25 AM   Additional Questions   Roomed by Hali Peralta RN       Are you in the first 12 months of your Medicare coverage?  No    Healthy Habits:     In general, how would you rate your overall health?  Good    Frequency of exercise:  2-3 days/week    Duration of exercise:  15-30 minutes    Do you usually eat at least 4 servings of fruit and vegetables a day, include whole grains    & fiber and avoid regularly eating high fat or \"junk\" foods?  No    Taking medications regularly:  Yes    Medication side effects:  None    Ability to successfully perform activities of daily living:  No assistance needed    Home Safety:  No safety concerns identified    Hearing Impairment:  No hearing concerns    In the past 6 months, have you been bothered by leaking of urine?  No    In general, how would you rate your overall mental or emotional health?  Good    Additional concerns today:  Yes      Patient seen via video virtual visit today for Medicare Annual Wellness Visit.   Discussed mammogram and dexa screening that patient is willing to consider and have ordered today. She has a transportation barrier- not talking to her daughter who would usually drive her and the bus stop near her house was moved so she is unable to get to it walking.     She will get her influenza, covid booster, and last shingrix vaccine at her local [facility] pharmacy.     She would like to note she is adopted so is unaware of any pertinent past family medical history.    She brings up some right shoulder pain that she has experienced for years. 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 Lately, has seemed to be worse. Pain is intermittent, exacerbated by repetitive movements such as shoveling or mopping the floor. She would be agreeable to a PT referral for " strengthening.     Have you ever done Advance Care Planning? (For example, a Health Directive, POLST, or a discussion with a medical provider or your loved ones about your wishes): No, advance care planning information given to patient to review.  Patient declined advance care planning discussion at this time.       Fall risk  Fallen 2 or more times in the past year?: No  Any fall with injury in the past year?: No    Cognitive Screening Unable to complete due to virtual visit; need for additional assessment in future face-to-face visit    Do you have sleep apnea, excessive snoring or daytime drowsiness? : no    Reviewed and updated as needed this visit by clinical staff   Tobacco  Allergies  Meds  Problems             Reviewed and updated as needed this visit by Provider                 Social History     Tobacco Use    Smoking status: Former     Packs/day: 0.25     Years: 20.00     Additional pack years: 0.00     Total pack years: 5.00     Types: Cigarettes     Start date: 1980     Quit date: 2015     Years since quittin.8    Smokeless tobacco: Never    Tobacco comments:     it was occasional and usually when I was around smokers.   Substance Use Topics    Alcohol use: Not Currently     Comment: I recently stopped.             2023     8:43 AM   Alcohol Use   Prescreen: >3 drinks/day or >7 drinks/week? No     Do you have a current opioid prescription? No  Do you use any other controlled substances or medications that are not prescribed by a provider? Cannabis            Current providers sharing in care for this patient include:   Patient Care Team:  Frances Albert MD as PCP - General (Family Medicine)  Carolin Carmichael MD as Assigned PCP    The following health maintenance items are reviewed in Epic and correct as of today:  Health Maintenance   Topic Date Due    DEXA  Never done    MAMMO SCREENING  Never done    LUNG CANCER SCREENING  Never done    RSV VACCINE (Pregnancy & 60+) (1 -  "1-dose 60+ series) Never done    EYE EXAM  06/24/2022    ZOSTER IMMUNIZATION (3 of 3) 11/01/2022    INFLUENZA VACCINE (1) 09/01/2023    COVID-19 Vaccine (4 - 2023-24 season) 09/01/2023    DIABETIC FOOT EXAM  09/06/2023    ANNUAL REVIEW OF HM ORDERS  09/06/2023    COLORECTAL CANCER SCREENING  03/11/2024    A1C  06/14/2024    BMP  12/14/2024    LIPID  12/14/2024    MICROALBUMIN  12/14/2024    MEDICARE ANNUAL WELLNESS VISIT  12/21/2024    FALL RISK ASSESSMENT  12/21/2024    ADVANCE CARE PLANNING  09/06/2027    DTAP/TDAP/TD IMMUNIZATION (3 - Td or Tdap) 09/06/2032    HEPATITIS C SCREENING  Completed    PHQ-2 (once per calendar year)  Completed    Pneumococcal Vaccine: 65+ Years  Completed    IPV IMMUNIZATION  Aged Out    HPV IMMUNIZATION  Aged Out    MENINGITIS IMMUNIZATION  Aged Out    RSV MONOCLONAL ANTIBODY  Aged Out       Mammogram Screening: Mammogram Screening: Recommended mammography every 1-2 years with patient discussion and risk factor consideration.  Patient has never had a mammogram before, says she does self breast exams at home. Did provide education on the lack of specificity of self breast exams versus mammography. She will think about getting a mammogram and is okay with us placing the order today.        2/22/2021     7:54 AM   Breast CA Risk Assessment (FHS-7)   Do you have a family history of breast, colon, or ovarian cancer? No / Unknown       OBJECTIVE:   There were no vitals taken for this visit. Estimated body mass index is 41.44 kg/m  as calculated from the following:    Height as of 12/14/23: 1.69 m (5' 6.54\").    Weight as of 12/14/23: 118.3 kg (260 lb 14.4 oz).  Physical Exam  Patient appears comfortable and in no acute distress.    ASSESSMENT / PLAN:       ICD-10-CM    1. Encounter for Medicare annual wellness exam  Z00.00       2. Right shoulder pain  M25.511       3. Visit for screening mammogram  Z12.31       4. Screening for osteoporosis  Z13.820             COUNSELING:  Reviewed " "preventive health counseling, as reflected in patient instructions       Fall risk prevention       Osteoporosis prevention/bone health       Advanced Planning       BMI:   Estimated body mass index is 41.44 kg/m  as calculated from the following:    Height as of 12/14/23: 1.69 m (5' 6.54\").    Weight as of 12/14/23: 118.3 kg (260 lb 14.4 oz).   Weight management plan: Patient was referred to their PCP to discuss a diet and exercise plan.      She reports that she quit smoking about 8 years ago. Her smoking use included cigarettes. She started smoking about 44 years ago. She has a 5 pack-year smoking history. She has never used smokeless tobacco.      Appropriate preventive services were discussed with this patient, including applicable screening as appropriate for fall prevention, nutrition, physical activity, Tobacco-use cessation, weight loss and cognition.  Checklist reviewing preventive services available has been given to the patient.    Reviewed patients plan of care and provided an AVS. The Basic Care Plan (routine screening as documented in Health Maintenance) for Evette meets the Care Plan requirement. This Care Plan has been established and reviewed with the Patient.          SPHP RN [address] [place] [address]    Identified Health Risks:  I have reviewed Opioid Use Disorder and Substance Use Disorder risk factors and made any needed referrals.   "

## 2024-03-29 DIAGNOSIS — Z12.11 COLON CANCER SCREENING: ICD-10-CM

## 2024-04-09 ENCOUNTER — TELEPHONE (OUTPATIENT)
Dept: FAMILY MEDICINE | Facility: CLINIC | Age: 69
End: 2024-04-09
Payer: COMMERCIAL

## 2024-04-09 DIAGNOSIS — Z79.4 TYPE 2 DIABETES MELLITUS WITHOUT COMPLICATION, WITH LONG-TERM CURRENT USE OF INSULIN (H): ICD-10-CM

## 2024-04-09 DIAGNOSIS — E11.9 TYPE 2 DIABETES MELLITUS WITHOUT COMPLICATION, WITH LONG-TERM CURRENT USE OF INSULIN (H): ICD-10-CM

## 2024-04-09 RX ORDER — INSULIN GLARGINE 100 [IU]/ML
22 INJECTION, SOLUTION SUBCUTANEOUS AT BEDTIME
Qty: 19.8 ML | Refills: 0 | Status: SHIPPED | OUTPATIENT
Start: 2024-04-09 | End: 2024-07-05

## 2024-04-09 NOTE — TELEPHONE ENCOUNTER
Medication Question or Refill    Contacts         Type Contact Phone/Fax    04/09/2024 11:44 AM CDT Phone (Incoming) [id number] - [place], [place] - [address] [facility] (Pharmacy) [phone]        What medication are you calling about (include dose and sig)?:   insulin glargine (LANTUS SOLOSTAR) 100 UNIT/ML pen     Preferred Pharmacy: [facility] which is updated    Who prescribed the medication?: Dr Alebrt    Do you need a refill?       Yes. Using increased dose of lantus solostar pen  Patient has been using 22 units at bedtime, not 12 units    Do you have any questions or concerns? Patient has been using insulin glargine (LANTUS VIAL) 100 UNIT/ML vial (Discontinued)  and never picked up lantus solostar pen    Needs new order of insulin glargine (LANTUS SOLOSTAR) 100 UNIT/ML pen     Could we send this information to you in [facility] or would you prefer to receive a phone call?:   Patient would prefer a phone call   Okay to leave a detailed message?: Yes at Cell number on file:    Telephone Information:   Mobile [phone]     Message routed to Dr Albert for review .    Latonia Faye RN  [facility]

## 2024-04-10 NOTE — TELEPHONE ENCOUNTER
"Patient informed and grateful medication was sent  Declined to schedule at time of call as did not have calendar  Stated would return call for scheduling \"you are on my list\".  "

## 2024-04-12 ENCOUNTER — ORDERS ONLY (AUTO-RELEASED) (OUTPATIENT)
Dept: FAMILY MEDICINE | Facility: CLINIC | Age: 69
End: 2024-04-12
Payer: COMMERCIAL

## 2024-04-12 DIAGNOSIS — Z12.11 COLON CANCER SCREENING: ICD-10-CM

## 2024-07-05 DIAGNOSIS — Z79.4 TYPE 2 DIABETES MELLITUS WITHOUT COMPLICATION, WITH LONG-TERM CURRENT USE OF INSULIN (H): ICD-10-CM

## 2024-07-05 DIAGNOSIS — E11.9 TYPE 2 DIABETES MELLITUS WITHOUT COMPLICATION, WITH LONG-TERM CURRENT USE OF INSULIN (H): ICD-10-CM

## 2024-07-07 RX ORDER — INSULIN GLARGINE 100 [IU]/ML
22 INJECTION, SOLUTION SUBCUTANEOUS AT BEDTIME
Qty: 19.8 ML | Refills: 0 | Status: SHIPPED | OUTPATIENT
Start: 2024-07-07 | End: 2024-08-14

## 2024-08-10 ENCOUNTER — HEALTH MAINTENANCE LETTER (OUTPATIENT)
Age: 69
End: 2024-08-10

## 2024-08-14 ENCOUNTER — OFFICE VISIT (OUTPATIENT)
Dept: FAMILY MEDICINE | Facility: CLINIC | Age: 69
End: 2024-08-14
Payer: COMMERCIAL

## 2024-08-14 VITALS
HEART RATE: 79 BPM | DIASTOLIC BLOOD PRESSURE: 90 MMHG | RESPIRATION RATE: 20 BRPM | WEIGHT: 293 LBS | OXYGEN SATURATION: 97 % | TEMPERATURE: 97.3 F | BODY MASS INDEX: 47.01 KG/M2 | SYSTOLIC BLOOD PRESSURE: 150 MMHG

## 2024-08-14 DIAGNOSIS — E66.813 CLASS 3 SEVERE OBESITY WITH SERIOUS COMORBIDITY AND BODY MASS INDEX (BMI) OF 45.0 TO 49.9 IN ADULT, UNSPECIFIED OBESITY TYPE (H): ICD-10-CM

## 2024-08-14 DIAGNOSIS — R60.0 BILATERAL LEG EDEMA: ICD-10-CM

## 2024-08-14 DIAGNOSIS — R01.1 UNDIAGNOSED CARDIAC MURMURS: ICD-10-CM

## 2024-08-14 DIAGNOSIS — Z79.4 TYPE 2 DIABETES MELLITUS WITHOUT COMPLICATION, WITH LONG-TERM CURRENT USE OF INSULIN (H): Primary | ICD-10-CM

## 2024-08-14 DIAGNOSIS — E66.01 CLASS 3 SEVERE OBESITY WITH SERIOUS COMORBIDITY AND BODY MASS INDEX (BMI) OF 45.0 TO 49.9 IN ADULT, UNSPECIFIED OBESITY TYPE (H): ICD-10-CM

## 2024-08-14 DIAGNOSIS — R03.0 ELEVATED BLOOD PRESSURE READING WITHOUT DIAGNOSIS OF HYPERTENSION: ICD-10-CM

## 2024-08-14 DIAGNOSIS — E78.2 MIXED HYPERLIPIDEMIA: ICD-10-CM

## 2024-08-14 DIAGNOSIS — E11.9 TYPE 2 DIABETES MELLITUS WITHOUT COMPLICATION, WITH LONG-TERM CURRENT USE OF INSULIN (H): Primary | ICD-10-CM

## 2024-08-14 LAB
ALBUMIN SERPL BCG-MCNC: 4 G/DL (ref 3.5–5.2)
ALP SERPL-CCNC: 86 U/L (ref 40–150)
ALT SERPL W P-5'-P-CCNC: 19 U/L (ref 0–50)
ANION GAP SERPL CALCULATED.3IONS-SCNC: 10 MMOL/L (ref 7–15)
AST SERPL W P-5'-P-CCNC: 31 U/L (ref 0–45)
BILIRUB SERPL-MCNC: 0.4 MG/DL
BUN SERPL-MCNC: 13.6 MG/DL (ref 8–23)
CALCIUM SERPL-MCNC: 10 MG/DL (ref 8.8–10.4)
CHLORIDE SERPL-SCNC: 108 MMOL/L (ref 98–107)
CHOLEST SERPL-MCNC: 198 MG/DL
CREAT SERPL-MCNC: 0.85 MG/DL (ref 0.51–0.95)
EGFRCR SERPLBLD CKD-EPI 2021: 73 ML/MIN/1.73M2
FASTING STATUS PATIENT QL REPORTED: YES
FASTING STATUS PATIENT QL REPORTED: YES
GLUCOSE SERPL-MCNC: 164 MG/DL (ref 70–99)
HBA1C MFR BLD: 7.2 % (ref 0–5.6)
HCO3 SERPL-SCNC: 25 MMOL/L (ref 22–29)
HDLC SERPL-MCNC: 55 MG/DL
LDLC SERPL CALC-MCNC: 122 MG/DL
NONHDLC SERPL-MCNC: 143 MG/DL
POTASSIUM SERPL-SCNC: 4.2 MMOL/L (ref 3.4–5.3)
PROT SERPL-MCNC: 6.6 G/DL (ref 6.4–8.3)
SODIUM SERPL-SCNC: 143 MMOL/L (ref 135–145)
TRIGL SERPL-MCNC: 104 MG/DL

## 2024-08-14 PROCEDURE — 83036 HEMOGLOBIN GLYCOSYLATED A1C: CPT | Performed by: FAMILY MEDICINE

## 2024-08-14 PROCEDURE — 99214 OFFICE O/P EST MOD 30 MIN: CPT | Performed by: FAMILY MEDICINE

## 2024-08-14 PROCEDURE — 36415 COLL VENOUS BLD VENIPUNCTURE: CPT | Performed by: FAMILY MEDICINE

## 2024-08-14 PROCEDURE — 80061 LIPID PANEL: CPT | Performed by: FAMILY MEDICINE

## 2024-08-14 PROCEDURE — G2211 COMPLEX E/M VISIT ADD ON: HCPCS | Performed by: FAMILY MEDICINE

## 2024-08-14 PROCEDURE — 80053 COMPREHEN METABOLIC PANEL: CPT | Performed by: FAMILY MEDICINE

## 2024-08-14 RX ORDER — INSULIN GLARGINE 100 [IU]/ML
22 INJECTION, SOLUTION SUBCUTANEOUS AT BEDTIME
Qty: 19.8 ML | Refills: 0 | Status: SHIPPED | OUTPATIENT
Start: 2024-08-14

## 2024-08-14 RX ORDER — FUROSEMIDE 40 MG
40 TABLET ORAL DAILY PRN
Qty: 14 TABLET | Refills: 0 | Status: SHIPPED | OUTPATIENT
Start: 2024-08-14 | End: 2024-09-03

## 2024-08-14 RX ORDER — FLASH GLUCOSE SENSOR
KIT MISCELLANEOUS
Qty: 2 EACH | Refills: 5 | Status: SHIPPED | OUTPATIENT
Start: 2024-08-14

## 2024-08-14 RX ORDER — FLUCONAZOLE 150 MG/1
150 TABLET ORAL
Qty: 12 TABLET | Refills: 1 | Status: CANCELLED | OUTPATIENT
Start: 2024-08-14 | End: 2025-01-23

## 2024-08-14 RX ORDER — AMITRIPTYLINE HYDROCHLORIDE 10 MG/1
5 TABLET ORAL AT BEDTIME
Qty: 15 TABLET | Refills: 3 | Status: CANCELLED | OUTPATIENT
Start: 2024-08-14 | End: 2024-12-12

## 2024-08-14 RX ORDER — SYRINGE-NEEDLE,INSULIN,0.5 ML 27GX1/2"
SYRINGE, EMPTY DISPOSABLE MISCELLANEOUS
Qty: 90 EACH | Refills: 1 | Status: SHIPPED | OUTPATIENT
Start: 2024-08-14

## 2024-08-14 NOTE — PROGRESS NOTES
Assessment & Plan     #Diabetes mellitus type 2  #Obesity   Patient has not been measuring her blood glucose at home and has been consuming more baked goods than usual, but no recent hypoglycemic signs or symptoms. Notes her toes feel fat and funny, but no feeling of numbness/tingling or pins/needles in her extremities. She wishes to take amitriptyline again. Diabetic foot exam was wnl and reassuring. HbA1c today 08/14 was 7.2%, which is slightly improved from last visit in December 2023 (7.8%). Patient open to trying the continuous glucose sensor. Currently taking insulin glargine 22 units in AM.   -  HbA1c  -  Free Style Trisha sensor and reader.   -  Refilled Insulin glargine 22 units daily  -  Refilled insulin syringe needle 100 units  -  CMP  -  CBC  - Diabetic eye exam - future - will schedule with her ophthalmologist     #High Blood Pressure concern for Essential HTN  #Undiagnosed diastolic murmur, aortic focus  #B/l lower extremity edema  # Hyperlipidemia   Pt was found today to have high blood pressure on two occasions 15min apart during this encounter. First /101mmHg and second /90mmHg. Pt does not measure BP at home. This is concerning for undiagnosed hypertension. On physical exam, diastolic aortic murmur noted as well as b/l leg 3+ pitting edema, which may be a further sign of uncontrolled HTN complicated by possible heart failure. Pt is however feeling fine and has not had any left sided heart failure symptoms. This was discussed with patient and she has agreed to take a diuretic for 2 weeks to see if it brings leg swelling down, and to follow up in clinic after to measure her BP again and talk about further treatment if needed. She declined compression due to not being able to place them herself.   - Lipid panel  - Echocardiogram  - Lasix 40mg daily for 14d   - F/up in clinic in 2 weeks for BP    #Onychomycosis of toenails  Pt has noted fungus in her toenails for several months. She is  "not interested in taking any oral or topical treatment at this time. She will let us know if she changes her mind.     Dr. Albert     The longitudinal plan of care for the diagnosis(es)/condition(s) as documented were addressed during this visit. Due to the added complexity in care, I will continue to support Evette in the subsequent management and with ongoing continuity of care.         BMI  Estimated body mass index is 47.01 kg/m  as calculated from the following:    Height as of 12/14/23: 1.69 m (5' 6.54\").    Weight as of this encounter: 134.3 kg (296 lb).             Subjective   Evette is a 69 year old with a pmhx of T2DM, hyperlipidemia, and obesity presenting for the following health issues:  Diabetes and Follow Up    Pt feeling fine today. She says she has been consuming more baked goods than usual and is mostly sedentary due to transportation issues in her neighborhood, which makes her concerned about recent weight increase. She hasn't been measuring her blood glucose at home, but denies any lightheadedness, cold sweats, palpitations, sob, chest pain or tremors. Currently taking insulin glargine 22 units in AM. She notices her feet have become swollen since May when she started walking more after her bus service was restored. She also notes her toes feel \"funny\" and fat, but no numbness, tingling or feeling pins and needles in lower or upper extremities. Amytriptiline used to help her with foot pain in the past but was discontinued (2022) because feet stopped hurting. She would like to restart this medication again.     She mentions she was diagnosed with a heart murmur 20 years ago but no further studies were done. No chest pain, sob, paroxysmal nocturnal dyspnea, claudication, syncope/near syncope, tinnitus, blurry vision, or headaches.            8/14/2024     9:39 AM   Additional Questions   Roomed by darren   Accompanied by self     History of Present Illness       Diabetes:   She presents for follow up of " diabetes.    She is not checking blood glucose.        She is concerned about other.   She is having numbness in feet.  The patient has not had a diabetic eye exam in the last 12 months.          She eats 2-3 servings of fruits and vegetables daily.She consumes 1 sweetened beverage(s) daily.She exercises with enough effort to increase her heart rate 10 to 19 minutes per day.  She exercises with enough effort to increase her heart rate 3 or less days per week.   She is taking medications regularly.                 ROS: 10 point ROS neg other than the symptoms noted above in the HPI.       Objective    BP (!) 150/90 (BP Location: Right arm, Patient Position: Sitting, Cuff Size: Adult Large)   Pulse 79   Temp 97.3  F (36.3  C) (Temporal)   Resp 20   Wt 134.3 kg (296 lb)   SpO2 97%   BMI 47.01 kg/m    Body mass index is 47.01 kg/m .  Physical Exam   GENERAL: alert and no distress, mildly teary when talking about relationship w/daughter   RESP: lungs clear to auscultation - no rales, rhonchi or wheezes  CV: regular rate and rhythm, normal S1 S2, no S3 or S4, diastolic murmur   Diabetic foot exam: normal DP and PT pulses, no trophic changes or ulcerative lesions, normal sensory exam, and normal monofilament exam, onychomycosis noted in Left 2nd,3rd and 4th toenails and Right 2nd and 3rd toenails.      Results for orders placed or performed in visit on 08/14/24 (from the past 24 hour(s))   Hemoglobin A1c   Result Value Ref Range    Hemoglobin A1C 7.2 (H) 0.0 - 5.6 %           Corie Becker MD, MPH  [facility] Medical Student  [employer]  [facility]     Signed Electronically by: Frances Albert MD

## 2024-08-14 NOTE — PATIENT INSTRUCTIONS
Lasix 40 mg in the morning as needed for leg swelling for up 14 days  Please schedule heart echocardiogram  Elevate your legs while at home

## 2024-08-16 ENCOUNTER — TELEPHONE (OUTPATIENT)
Dept: FAMILY MEDICINE | Facility: CLINIC | Age: 69
End: 2024-08-16
Payer: COMMERCIAL

## 2024-08-16 DIAGNOSIS — E11.42 DIABETIC POLYNEUROPATHY ASSOCIATED WITH TYPE 2 DIABETES MELLITUS (H): ICD-10-CM

## 2024-08-16 RX ORDER — AMITRIPTYLINE HYDROCHLORIDE 10 MG/1
10 TABLET ORAL AT BEDTIME
Qty: 90 TABLET | Refills: 0 | Status: CANCELLED | OUTPATIENT
Start: 2024-08-16

## 2024-08-16 NOTE — TELEPHONE ENCOUNTER
Medication Question or Refill    What medication are you calling about (include dose and sig)?:   Amitriptyline 10 mg  Continuous Glucose Sensor (FREESTYLE KIMBERLYN 14 DAY SENSOR) [facility]     Preferred Pharmacy:    [facility][id number] - [address] AT [address] [place] [address] & [address] [address]  Phone: [phone] Fax: [phone]    Who prescribed the medication?:   Amitriptyline 10 mg was prescribed in 2022 by Carolin Carmichael MD.   2.  Continuous Glucose Sensor (FREESTYLE KIMBERLYN 14 DAY SENSOR) [facility] prescribed by Dr. Albert     Do you need a refill? Yes, for the Amitriptyline    When did you use the medication last? Amitriptyline was prescribed in 2022    Do you have any questions or concerns?  Yes: Pt had an appt with PCP on 8/14/24. Amitriptyline was discussed during visit. Pt does not remember if PCP will refill medication as it was not sent to pharmacy. Pt stated [facility] does not have her medications in stock so pt will go  medications at [facility]. Pt also reported she is not able to pay for the Continuous Glucose Sensor out of pocket at this time and  the pharmacy was trying to sell her a device to read her BG.     Could we send this information to you in [facility] or would you prefer to receive a phone call?:   Patient would prefer a phone call   Okay to leave a detailed message?: No at Home number on file [phone] (home)     Routed to PCP for review    Ronny ESPOSITO RN  [facility] [place]

## 2024-08-16 NOTE — TELEPHONE ENCOUNTER
Amitriptyline pended:    8/14/24 visit:  #Diabetes mellitus type 2  #Obesity   Patient has not been measuring her blood glucose at home and has been consuming more baked goods than usual, but no recent hypoglycemic signs or symptoms. Notes her toes feel fat and funny, but no feeling of numbness/tingling or pins/needles in her extremities. She wishes to take amitriptyline again.    Blood glucose meter supplies pended.    CASTRO OliveiraN, PHN, RN  [facility]  [phone]

## 2024-08-21 RX ORDER — BLOOD-GLUCOSE CONTROL, NORMAL
EACH MISCELLANEOUS
Qty: 100 EACH | Refills: 11 | Status: SHIPPED | OUTPATIENT
Start: 2024-08-21

## 2024-08-21 NOTE — TELEPHONE ENCOUNTER
During visit we discussed edema was likely contributing to symptoms and to start lasix and not elavil.  DM

## 2024-08-28 DIAGNOSIS — R60.0 BILATERAL LEG EDEMA: ICD-10-CM

## 2024-08-28 NOTE — TELEPHONE ENCOUNTER
Pending Prescriptions:                       Disp   Refills    furosemide (LASIX) 40 MG tablet           14 tab*0            Sig: Take 1 tablet (40 mg) by mouth daily as needed           (leg swelling).

## 2024-08-30 RX ORDER — FUROSEMIDE 40 MG
40 TABLET ORAL DAILY PRN
Qty: 14 TABLET | Refills: 0 | OUTPATIENT
Start: 2024-08-30

## 2024-08-31 DIAGNOSIS — R60.0 BILATERAL LEG EDEMA: ICD-10-CM

## 2024-08-31 NOTE — TELEPHONE ENCOUNTER
Medication Question or Refill    Contacts       Contact Date/Time Type Contact Phone/Fax    08/31/2024 08:07 AM CDT Phone (Incoming) Evette Herzog (Self) [phone] (H)     Ok to leave a detailed voicemail            What medication are you calling about (include dose and sig)?: furosemide (LASIX) 40 MG tablet     Preferred Pharmacy:       [id number] - [place], [place] - [address] AT [facility]  [address]  [address] [place] [address]  Phone: [phone] Fax: [phone]      Controlled Substance Agreement on file:   CSA -- Patient Level:    CSA: None found at the patient level.       Who prescribed the medication?:     Do you need a refill? Yes    When did you use the medication last? 8/31/21    Patient offered an appointment? Yes: 10/17/24    Do you have any questions or concerns?  No      Could we send this information to you in [facility] or would you prefer to receive a phone call?:   Patient would prefer a phone call   Okay to leave a detailed message?: Yes at Cell number on file:    Telephone Information:   Mobile [phone]

## 2024-09-03 RX ORDER — FUROSEMIDE 40 MG
40 TABLET ORAL DAILY PRN
Qty: 14 TABLET | Refills: 0 | OUTPATIENT
Start: 2024-09-03

## 2024-09-03 NOTE — TELEPHONE ENCOUNTER
Dr. Albert, please review advise. Would you be able to bridge med?    Called and discussed with Evette and stated that when she couldn't make it to her 2 weeks follow up, she had gotten injured and could not make it. So she stated she should have gotten a bridge til the appointment made to 10/17.

## 2024-09-03 NOTE — TELEPHONE ENCOUNTER
Agree with RF RN.  #14 tabs prescribed on 8/14/2024 for a 2-week course with instructions for patient to follow-up with PCP in 2 weeks.  I don't see a follow-up scheduled.  Please remind patient of plan.  OK to hold for PCP who will be in clinic tomorrow.      Chinyere Hope MD  [facility]

## 2024-09-03 NOTE — TELEPHONE ENCOUNTER
" -- pended lasix to get to appt on 10/17    Patient states that the edema is improved. \"My pants that used to be super tight in the legs now fit again. My toes also look like toes again\". Foot pain is now improved.     Consuelo Musa RN  [facility]    "

## 2024-09-03 NOTE — TELEPHONE ENCOUNTER
Per Dr. Vail's last visit. This was just a 14 day course. Will defer refill to PCP.    Abelardo Dean, DO

## 2024-09-04 RX ORDER — FUROSEMIDE 40 MG
40 TABLET ORAL DAILY PRN
Qty: 45 TABLET | Refills: 0 | Status: SHIPPED | OUTPATIENT
Start: 2024-09-04

## 2024-09-04 NOTE — TELEPHONE ENCOUNTER
Patient is out of medication and in order to get medication delivered today needs to be sent before 3pm today

## 2024-10-17 ENCOUNTER — OFFICE VISIT (OUTPATIENT)
Dept: FAMILY MEDICINE | Facility: CLINIC | Age: 69
End: 2024-10-17
Payer: COMMERCIAL

## 2024-10-17 VITALS
RESPIRATION RATE: 16 BRPM | SYSTOLIC BLOOD PRESSURE: 138 MMHG | HEIGHT: 66 IN | HEART RATE: 98 BPM | DIASTOLIC BLOOD PRESSURE: 84 MMHG | WEIGHT: 287 LBS | OXYGEN SATURATION: 98 % | BODY MASS INDEX: 46.12 KG/M2 | TEMPERATURE: 97.7 F

## 2024-10-17 DIAGNOSIS — E11.42 DIABETIC POLYNEUROPATHY ASSOCIATED WITH TYPE 2 DIABETES MELLITUS (H): Primary | ICD-10-CM

## 2024-10-17 DIAGNOSIS — I89.0 LYMPHEDEMA: ICD-10-CM

## 2024-10-17 DIAGNOSIS — E78.5 HYPERLIPIDEMIA LDL GOAL <70: ICD-10-CM

## 2024-10-17 PROCEDURE — G2211 COMPLEX E/M VISIT ADD ON: HCPCS | Performed by: FAMILY MEDICINE

## 2024-10-17 PROCEDURE — 99214 OFFICE O/P EST MOD 30 MIN: CPT | Performed by: FAMILY MEDICINE

## 2024-10-17 RX ORDER — ATORVASTATIN CALCIUM 20 MG/1
20 TABLET, FILM COATED ORAL AT BEDTIME
Qty: 90 TABLET | Refills: 3 | Status: SHIPPED | OUTPATIENT
Start: 2024-10-17

## 2024-10-17 RX ORDER — BUMETANIDE 0.5 MG/1
1.5 TABLET ORAL DAILY
Qty: 90 TABLET | Refills: 1 | Status: SHIPPED | OUTPATIENT
Start: 2024-10-17 | End: 2024-12-16

## 2024-10-17 NOTE — PROGRESS NOTES
"  Assessment & Plan     Diabetic polyneuropathy associated with type 2 diabetes mellitus (H)  - congratulated on weight loss  - declined GLP1  - Hemoglobin A1c; Future  - PRIMARY CARE FOLLOW-UP SCHEDULING; Future    Lymphedema  - She is unable to schedule echocardiogram and will schedule early 2025  - discussed concern for MARY ANN and would also recommend   - symptoms not controlled  - will stop lasix 40 mg daily and start bumex 1.5 mg daily, recheck BMP in one month. Advised to monitor side effects including lightheaded/dizziness and call if experiencing symptoms.  - bumetanide (BUMEX) 0.5 MG tablet; Take 3 tablets (1.5 mg) by mouth daily.  - Basic metabolic panel  (Ca, Cl, CO2, Creat, Gluc, K, Na, BUN); Future  - Compression Sleeve/Stocking Order for DME - ONLY FOR DME  - PRIMARY CARE FOLLOW-UP SCHEDULING; Future    Hyperlipidemia LDL goal <70  - agreeable to start stain  - recheck in 3-6 months   - atorvastatin (LIPITOR) 20 MG tablet; Take 1 tablet (20 mg) by mouth at bedtime.  - PRIMARY CARE FOLLOW-UP SCHEDULING; Future    The longitudinal plan of care for the diagnosis(es)/condition(s) as documented were addressed during this visit. Due to the added complexity in care, I will continue to support Evette in the subsequent management and with ongoing continuity of care.       BMI  Estimated body mass index is 45.8 kg/m  as calculated from the following:    Height as of this encounter: 1.686 m (5' 6.38\").    Weight as of this encounter: 130.2 kg (287 lb).             Subjective   Evette is a 69 year old, presenting for the following health issues:  Recheck Medication and Follow Up      10/17/2024    11:58 AM   Additional Questions   Roomed by darren   Accompanied by self     History of Present Illness       Reason for visit:  Water retention    She eats 2-3 servings of fruits and vegetables daily.She consumes 0 sweetened beverage(s) daily.She exercises with enough effort to increase her heart rate 10 to 19 minutes per day.  She " exercises with enough effort to increase her heart rate 4 days per week.   She is taking medications regularly.       She is having legal issues and haven't been able to schedule appointments.     She is trying to get to eye provider.     She doesn't have a car.     [employer] $3.5 each way.     Concerned about patricia danlos due to increased hypermobility. She does have increase bruising.           Objective    There were no vitals taken for this visit.  There is no height or weight on file to calculate BMI.  Physical Exam               Signed Electronically by: Frances Albert MD

## 2024-10-17 NOTE — PATIENT INSTRUCTIONS
Leg swelling -   Stop lasix 40 mg daily and start bumex 1.5 mg daily   Schedule lab only visit in one month     Cholesterol -   Start Lipitor 20 mg at night

## 2024-10-30 ENCOUNTER — TELEPHONE (OUTPATIENT)
Dept: FAMILY MEDICINE | Facility: CLINIC | Age: 69
End: 2024-10-30
Payer: COMMERCIAL

## 2024-10-30 DIAGNOSIS — Z84.81 FAMILY HISTORY OF CARRIER OF GENETIC DISEASE: ICD-10-CM

## 2024-10-30 DIAGNOSIS — R01.1 UNDIAGNOSED CARDIAC MURMURS: Primary | ICD-10-CM

## 2024-10-30 NOTE — TELEPHONE ENCOUNTER
Dr. Albert: pended genetics referral unless we do this testing.  Please double check associated dx.    Angela CASTORENA BSN, PHN, RN-[facility]  [phone]

## 2024-10-30 NOTE — TELEPHONE ENCOUNTER
Order/Referral Request    Who is requesting: Pt    Orders being requested: Blood test for Tian Danlos    Reason service is needed/diagnosis: Wants confirmation so kids can get tested if needed    When are orders needed by: 11/21    Has this been discussed with Provider: Yes    Does patient have a preference on a Group/Provider/Facility? NA    Does patient have an appointment scheduled?: No    Where to send orders: Place orders within Epic    Could we send this information to you in [employer] or would you prefer to receive a phone call?:   Patient would prefer a phone call   Okay to leave a detailed message?: Yes at [phone] - okay to leave message on [place]

## 2024-11-07 ENCOUNTER — TELEPHONE (OUTPATIENT)
Dept: CONSULT | Facility: CLINIC | Age: 69
End: 2024-11-07
Payer: COMMERCIAL

## 2024-11-08 NOTE — TELEPHONE ENCOUNTER
Referral received for patient to be seen in Genetics for Joint Hypermobility/EDS. Patient's chart reviewed by Genetics team--unable to schedule patient as department does not see adult patients for hypermobility/EDS. Inbasket message sent to referring provider containing detailed explanation of this.

## 2024-11-21 ENCOUNTER — PATIENT OUTREACH (OUTPATIENT)
Dept: CARE COORDINATION | Facility: CLINIC | Age: 69
End: 2024-11-21

## 2024-12-05 ENCOUNTER — PATIENT OUTREACH (OUTPATIENT)
Dept: CARE COORDINATION | Facility: CLINIC | Age: 69
End: 2024-12-05
Payer: COMMERCIAL

## 2024-12-10 ENCOUNTER — LAB (OUTPATIENT)
Dept: LAB | Facility: CLINIC | Age: 69
End: 2024-12-10
Payer: COMMERCIAL

## 2024-12-10 DIAGNOSIS — I89.0 LYMPHEDEMA: ICD-10-CM

## 2024-12-10 DIAGNOSIS — E11.42 DIABETIC POLYNEUROPATHY ASSOCIATED WITH TYPE 2 DIABETES MELLITUS (H): ICD-10-CM

## 2024-12-10 LAB
ANION GAP SERPL CALCULATED.3IONS-SCNC: 15 MMOL/L (ref 7–15)
BUN SERPL-MCNC: 14.9 MG/DL (ref 8–23)
CALCIUM SERPL-MCNC: 10 MG/DL (ref 8.8–10.4)
CHLORIDE SERPL-SCNC: 100 MMOL/L (ref 98–107)
CREAT SERPL-MCNC: 0.89 MG/DL (ref 0.51–0.95)
CREAT UR-MCNC: 66.5 MG/DL
EGFRCR SERPLBLD CKD-EPI 2021: 69 ML/MIN/1.73M2
EST. AVERAGE GLUCOSE BLD GHB EST-MCNC: 157 MG/DL
GLUCOSE SERPL-MCNC: 345 MG/DL (ref 70–99)
HBA1C MFR BLD: 7.1 % (ref 0–5.6)
HCO3 SERPL-SCNC: 23 MMOL/L (ref 22–29)
MICROALBUMIN UR-MCNC: <12 MG/L
MICROALBUMIN/CREAT UR: NORMAL MG/G{CREAT}
POTASSIUM SERPL-SCNC: 3.6 MMOL/L (ref 3.4–5.3)
SODIUM SERPL-SCNC: 138 MMOL/L (ref 135–145)

## 2024-12-10 PROCEDURE — 82043 UR ALBUMIN QUANTITATIVE: CPT

## 2024-12-10 PROCEDURE — 83036 HEMOGLOBIN GLYCOSYLATED A1C: CPT

## 2024-12-10 PROCEDURE — 80048 BASIC METABOLIC PNL TOTAL CA: CPT

## 2024-12-10 PROCEDURE — 82570 ASSAY OF URINE CREATININE: CPT

## 2024-12-10 PROCEDURE — 36415 COLL VENOUS BLD VENIPUNCTURE: CPT

## 2024-12-16 DIAGNOSIS — Z79.4 TYPE 2 DIABETES MELLITUS WITHOUT COMPLICATION, WITH LONG-TERM CURRENT USE OF INSULIN (H): ICD-10-CM

## 2024-12-16 DIAGNOSIS — E11.9 TYPE 2 DIABETES MELLITUS WITHOUT COMPLICATION, WITH LONG-TERM CURRENT USE OF INSULIN (H): ICD-10-CM

## 2024-12-16 RX ORDER — INSULIN GLARGINE 100 [IU]/ML
22 INJECTION, SOLUTION SUBCUTANEOUS AT BEDTIME
Qty: 19.8 ML | Refills: 0 | Status: SHIPPED | OUTPATIENT
Start: 2024-12-16

## 2024-12-16 NOTE — TELEPHONE ENCOUNTER
Medication Question or Refill        What medication are you calling about (include dose and sig)?: insulin    Preferred Pharmacy:     [facility] #[id number] - [place], [place] - [address] [facility] [address]  [address]  [place] [address]  Phone: [phone] Fax: [phone]        Controlled Substance Agreement on file:   CSA -- Patient Level:    CSA: None found at the patient level.       Who prescribed the medication?: pcp    Do you need a refill? Yes    When did you use the medication last? ongoing    Patient offered an appointment? No    Do you have any questions or concerns?  Yes: awaiting to  medications, hoping to get all of her medications on Thursday       Could we send this information to you in [facility] or would you prefer to receive a phone call?:   Patient would prefer a phone call   Okay to leave a detailed message?: Yes at Cell number on file:    Telephone Information:   Mobile [phone]

## 2024-12-28 DIAGNOSIS — I89.0 LYMPHEDEMA: ICD-10-CM

## 2024-12-29 RX ORDER — BUMETANIDE 0.5 MG/1
TABLET ORAL
Qty: 270 TABLET | OUTPATIENT
Start: 2024-12-29

## 2025-01-25 ENCOUNTER — HEALTH MAINTENANCE LETTER (OUTPATIENT)
Age: 70
End: 2025-01-25

## 2025-03-13 ENCOUNTER — TELEPHONE (OUTPATIENT)
Dept: FAMILY MEDICINE | Facility: CLINIC | Age: 70
End: 2025-03-13
Payer: COMMERCIAL

## 2025-03-13 DIAGNOSIS — Z79.4 TYPE 2 DIABETES MELLITUS WITHOUT COMPLICATION, WITH LONG-TERM CURRENT USE OF INSULIN (H): ICD-10-CM

## 2025-03-13 DIAGNOSIS — E11.9 TYPE 2 DIABETES MELLITUS WITHOUT COMPLICATION, WITH LONG-TERM CURRENT USE OF INSULIN (H): ICD-10-CM

## 2025-03-13 RX ORDER — INSULIN GLARGINE 100 [IU]/ML
22 INJECTION, SOLUTION SUBCUTANEOUS AT BEDTIME
Qty: 19.8 ML | Refills: 0 | Status: SHIPPED | OUTPATIENT
Start: 2025-03-13

## 2025-03-18 ENCOUNTER — TELEPHONE (OUTPATIENT)
Dept: FAMILY MEDICINE | Facility: CLINIC | Age: 70
End: 2025-03-18
Payer: COMMERCIAL

## 2025-03-18 DIAGNOSIS — I89.0 LYMPHEDEMA: ICD-10-CM

## 2025-03-18 NOTE — TELEPHONE ENCOUNTER
Medication Question    What medication are you calling about (include dose and sig)?: bumetanide (BUMEX) 0.5 MG tablet     Preferred Pharmacy:  [id number] - [place], [place] - [address] AT [facility] [address]  [address]  [place] [address]  Phone: [phone] Fax: [phone]    Who prescribed the medication?: Clint Deleon PA-C    Do you need a refill? No    When did you use the medication last? 3/18/25    Do you have any questions or concerns?  Yes:   The pharmacy was out of the bumetanide that I usually take which is the small round and blue tablet  The last bottle of bumetanide was a different , it is an oval shape green color and it makes me woozy  I want to decrease from 3 to 2 tablets daily to see if if helps because I don't want to fall    Could we send this information to you in [facility] or would you prefer to receive a phone call?:   Patient would prefer a phone call   Okay to leave a detailed message?: Yes at Home number on file [phone] (home)     Dr. Albert/NATHAN,    Please review and advise.    Ronny ESPOSITO RN  [facility]

## 2025-03-19 RX ORDER — BUMETANIDE 0.5 MG/1
1 TABLET ORAL DAILY
Status: SHIPPED
Start: 2025-03-19

## 2025-03-19 NOTE — TELEPHONE ENCOUNTER
Ok to decrease bumex to 1 mg daily, updated med list.   Please have pt schedule for chronic conditions  DM

## 2025-03-19 NOTE — TELEPHONE ENCOUNTER
"Spoke with patient.  She did reduce to 1 mg this morning and is \"feeling much much better.\"  States \"wooziness\" has resolved.  BP after meds and after activity is \"109/70-something.\"      Pt was aware Dr. Albert is leaving and was scheduled to establish care and f/up on chronic conditions with Kwadwo on 4/17/25.    Angela CASTORENA, BSN, PHN, RN-[facility]  [phone]        "

## 2025-04-17 ENCOUNTER — ORDERS ONLY (AUTO-RELEASED) (OUTPATIENT)
Dept: FAMILY MEDICINE | Facility: CLINIC | Age: 70
End: 2025-04-17

## 2025-04-17 DIAGNOSIS — Z12.11 SCREEN FOR COLON CANCER: ICD-10-CM

## 2025-04-17 PROBLEM — R60.0 LOWER EXTREMITY EDEMA: Status: ACTIVE | Noted: 2025-04-17

## 2025-04-18 ENCOUNTER — TELEPHONE (OUTPATIENT)
Dept: FAMILY MEDICINE | Facility: CLINIC | Age: 70
End: 2025-04-18
Payer: COMMERCIAL

## 2025-04-18 NOTE — TELEPHONE ENCOUNTER
Prior Authorization Retail Medication Request    Medication/Dose: Prodigy AutoCode Blood Glucose w/Device Kit  Diagnosis and ICD code (if different than what is on RX):  E11.42]     Insurance   Primary:   [employer] MEDICARE ADVANTAGE       Insurance ID:    [id number]         Pharmacy Information (if different than what is on RX)  Name:  [facility]  Phone:  [phone]  Fax: [phone]    Prior Authorization Retail Medication Request    Medication/Dose: Prodigy No Coding Blood Glu Strips  Diagnosis and ICD code (if different than what is on RX):   [E11.42]     Insurance   Primary:   [facility] MEDICARE ADVANTAGE       Insurance ID:    [id number]         Pharmacy Information (if different than what is on RX)  Name:  [facility]  Phone:  [phone]  Fax: [phone]

## 2025-04-24 NOTE — TELEPHONE ENCOUNTER
Can you please notify patient that I re-sent a different glucose monitor/supplies to the pharmacy so she can be checking her blood sugars? Please have her let me know if not covered by insurance.     Thanks!   Kwadwo

## 2025-04-24 NOTE — TELEPHONE ENCOUNTER
Writer called patient and reviewed message per H. NOEMI Keita.    Patient verbalized understanding and in agreement with plan.    KARIN Henderson, RN-BC  [facility]

## 2025-04-30 ENCOUNTER — TELEPHONE (OUTPATIENT)
Dept: FAMILY MEDICINE | Facility: CLINIC | Age: 70
End: 2025-04-30
Payer: COMMERCIAL

## 2025-04-30 DIAGNOSIS — Z79.4 TYPE 2 DIABETES MELLITUS WITHOUT COMPLICATION, WITH LONG-TERM CURRENT USE OF INSULIN (H): ICD-10-CM

## 2025-04-30 DIAGNOSIS — E11.9 TYPE 2 DIABETES MELLITUS WITHOUT COMPLICATION, WITH LONG-TERM CURRENT USE OF INSULIN (H): ICD-10-CM

## 2025-04-30 DIAGNOSIS — I89.0 LYMPHEDEMA: ICD-10-CM

## 2025-04-30 NOTE — TELEPHONE ENCOUNTER
Pt calling with 3 concerns:    Manual glucometer and supplies are being denied.  She is now willing to use a CGM.  4/17 note reviewed, unsure which to pend.    Pt is almost out of bumetanide, states previous rx by [facility].  Clarified that pt was previously taking three tablets at the same time when she was having concerns for passing out.  At the time of 4/17 labs was taking two tablets in  doses.  Has been trialing the third dose mid-day and has not had any further concerns.  Pended for re-order at twice daily dosing.    She is now taking 30 units of Lantus at bedtime; pended change.  States her sugars are high but also that she has not been able to check recently as a new meter is not covered.    OK to call or MyChart - ok to LDM though states she often does not answer her phone.    Angela CASTORENA BSN, PHN, RN-[facility]  [phone]

## 2025-05-01 ENCOUNTER — TELEPHONE (OUTPATIENT)
Dept: FAMILY MEDICINE | Facility: CLINIC | Age: 70
End: 2025-05-01
Payer: COMMERCIAL

## 2025-05-01 RX ORDER — INSULIN GLARGINE 100 [IU]/ML
30 INJECTION, SOLUTION SUBCUTANEOUS AT BEDTIME
Qty: 15 ML | Refills: 1 | Status: SHIPPED | OUTPATIENT
Start: 2025-05-01

## 2025-05-01 RX ORDER — HYDROCHLOROTHIAZIDE 12.5 MG/1
CAPSULE ORAL
Qty: 6 EACH | Refills: 3 | Status: SHIPPED | OUTPATIENT
Start: 2025-05-01

## 2025-05-01 RX ORDER — HYDROCHLOROTHIAZIDE 12.5 MG/1
CAPSULE ORAL
Qty: 6 EACH | Refills: 3 | Status: SHIPPED | OUTPATIENT
Start: 2025-05-01 | End: 2025-05-01

## 2025-05-01 RX ORDER — KETOROLAC TROMETHAMINE 30 MG/ML
1 INJECTION, SOLUTION INTRAMUSCULAR; INTRAVENOUS ONCE
Qty: 1 EACH | Refills: 0 | Status: SHIPPED | OUTPATIENT
Start: 2025-05-01 | End: 2025-05-01

## 2025-05-01 RX ORDER — BUMETANIDE 0.5 MG/1
0.5 TABLET ORAL
Qty: 180 TABLET | Refills: 1 | Status: SHIPPED | OUTPATIENT
Start: 2025-05-01 | End: 2025-10-28

## 2025-05-01 NOTE — TELEPHONE ENCOUNTER
Attempt #1.     Writer called and left message on patient's voicemail to call back and speak with a triage nurse.    Mita Morales, KARIN RN  [facility]      Message from Kwadwo MCALLISTER:   I sent a CGM 3+ monitor to the [facility]. Hopefully this will be covered. It will be mailed out to her. She should let me know if any coverage issues.    Refilled medications.  Follow-up with me in 3 months for diabetes. Help schedule.   ---------  Original message:  Pt calling with 3 concerns:     Manual glucometer and supplies are being denied.  She is now willing to use a CGM.  4/17 note reviewed, unsure which to pend.     Pt is almost out of bumetanide, states previous rx by [facility].  Clarified that pt was previously taking three tablets at the same time when she was having concerns for passing out.  At the time of 4/17 labs was taking two tablets in  doses.  Has been trialing the third dose mid-day and has not had any further concerns.  Pended for re-order at twice daily dosing.     She is now taking 30 units of Lantus at bedtime; pended change.  States her sugars are high but also that she has not been able to check recently as a new meter is not covered.     OK to call or MyChart - ok to LDM though states she often does not answer her phone.     CASTRO OliveiraN, PHN, RN-[facility]  [phone]

## 2025-05-01 NOTE — TELEPHONE ENCOUNTER
Hi Team:     I sent a CGM 3+ monitor to the [facility] pharmacy. Hopefully this will be covered. It will be mailed out to her. She should let me know if any coverage issues.     Refilled medications.    Follow-up with me in 3 months for diabetes. Help schedule.     Kwadwo

## 2025-05-01 NOTE — TELEPHONE ENCOUNTER
"Danilo - I know this question has come up before, but I always prescribe the \"general\" glucose meter and supplies that can be substituted by anything in the diabetes smartset. This is the second time, it's denied by a medicare patient. Is it always the accu-chek glucose monitor/supplies that is covered? I assume that a CGM won't be covered here but maybe b/c on insulin? Do you know? Thanks for your input before I get back to the patient.     Thanks!   Kwadwo"

## 2025-05-06 NOTE — TELEPHONE ENCOUNTER
2nd attempt.  LM for pt with this message.  Cammie, Triage RN  [facility]/ [phone]

## 2025-05-09 ENCOUNTER — TELEPHONE (OUTPATIENT)
Dept: FAMILY MEDICINE | Facility: CLINIC | Age: 70
End: 2025-05-09
Payer: COMMERCIAL

## 2025-05-09 NOTE — TELEPHONE ENCOUNTER
"Dr. Escamilla --    Patient requesting:     Bumex. \"change the Bumex directions because now that she is spacing out on when she takes 3 tablets like the directions before the new change, it has been helping her.\"    Marijuana card. Patient thinks it will save her money.     CASTRO BradfordN RN  [facility]    "

## 2025-05-09 NOTE — TELEPHONE ENCOUNTER
Patient called requesting if she can have provider change the Bumex directions because now that she is spacing out on when she takes 3 tablets like the directions before the new change, it has been helping her. Patient also wants further information on getting a medical marijuana card so that she can save money going to the dispensaries.

## 2025-05-10 NOTE — TELEPHONE ENCOUNTER
I think this is for Kwadwo!    Ericka Escamilla, DO  Internal Medicine - Pediatrics Physician  [facility]

## 2025-05-12 NOTE — TELEPHONE ENCOUNTER
Needs follow-up appointment with me this week (one month post last visit). Please help her get scheduled. Will discuss items at visit.     Thanks!   HTM

## 2025-05-12 NOTE — TELEPHONE ENCOUNTER
LVM relaying message below from clinician and advised to return call to care team for scheduling this week

## 2025-05-15 ENCOUNTER — OFFICE VISIT (OUTPATIENT)
Dept: FAMILY MEDICINE | Facility: CLINIC | Age: 70
End: 2025-05-15
Payer: COMMERCIAL

## 2025-05-15 VITALS
DIASTOLIC BLOOD PRESSURE: 82 MMHG | TEMPERATURE: 98 F | HEIGHT: 67 IN | HEART RATE: 92 BPM | BODY MASS INDEX: 43.35 KG/M2 | OXYGEN SATURATION: 96 % | WEIGHT: 276.2 LBS | SYSTOLIC BLOOD PRESSURE: 132 MMHG | RESPIRATION RATE: 16 BRPM

## 2025-05-15 DIAGNOSIS — E11.42 DIABETIC POLYNEUROPATHY ASSOCIATED WITH TYPE 2 DIABETES MELLITUS (H): ICD-10-CM

## 2025-05-15 DIAGNOSIS — Z79.4 TYPE 2 DIABETES MELLITUS WITH HYPERGLYCEMIA, WITH LONG-TERM CURRENT USE OF INSULIN (H): Primary | ICD-10-CM

## 2025-05-15 DIAGNOSIS — E11.65 TYPE 2 DIABETES MELLITUS WITH HYPERGLYCEMIA, WITH LONG-TERM CURRENT USE OF INSULIN (H): Primary | ICD-10-CM

## 2025-05-15 DIAGNOSIS — I89.0 LYMPHEDEMA: ICD-10-CM

## 2025-05-15 DIAGNOSIS — E78.5 HYPERLIPIDEMIA LDL GOAL <70: ICD-10-CM

## 2025-05-15 LAB
FASTING STATUS PATIENT QL REPORTED: YES
GLUCOSE SERPL-MCNC: 165 MG/DL (ref 70–99)

## 2025-05-15 RX ORDER — BUMETANIDE 0.5 MG/1
0.5 TABLET ORAL 3 TIMES DAILY
Status: SHIPPED
Start: 2025-05-15

## 2025-05-15 ASSESSMENT — PAIN SCALES - GENERAL: PAINLEVEL_OUTOF10: NO PAIN (0)

## 2025-05-15 NOTE — PROGRESS NOTES
Assessment & Plan     Type 2 diabetes mellitus without complication, with long-term current use of insulin (H)  Diabetic polyneuropathy associated with type 2 diabetes mellitus (H)  Hyperlipidemia LDL goal <70  - Daily baby aspirin use: No  - Blood pressure is at goal 0 120/70 today.   - Statin: Atorvastatin 20mg daily.   - Smoking: No.  - Current diabetes therapy: Lantus 30 units at bedtime.   - Eye exam: She will schedule in near future.   - Foot exam: 4/2025 - normal.  - Glucose monitoring: No, working on getting supplies but not affordable right now.  - Hypoglycemia episodes: Not that she is aware of.   - She has stable peripheral neuropathy with no acute change. Denies recent vision changes. No chest pain, nausea, vomiting, diarrhea.  Other notes: She has had some financial stress over the last few months - resulting in her needing to eat cheaper foods like bread and pasta versus protein and veggies. She is connected with the [facility] and just was approved for more resources/assistance. Therefore, she also can't afford glucose monitoring supplies but will start once she gets additional assistance. She is excited to improve diet once able to.      Plan:   - Continue lantus 30 units daily.   - Start ozempic titrating up each month if covered by insurance. Reviewed medication contraindications (no family or personal history of thyroid/MEN2 cancer), side effects, and how it works.   - Check fasting glucose sugar today to see if there has been change (too soon for A1c) given she is not monitoring at home due to cost of supplies. Offered social work referral but she declines for now.   - Check blood sugars daily (at least fasting AM, one 2-hour post-meal check) once she has supplies.   - Discussed important of lifestyle, including getting at least 150 minutes of aerobic exercise weekly and following a healthy diet (mediterranean is the best).   - Repeat A1c in 2 months with follow-up visit. If GLP-1 not covered, I  will have her see pharmacy for further titration of lantus +/- adding meal time insulin if needed.       - Glucose; Future  - semaglutide (OZEMPIC) 2 MG/3ML pen; Inject 0.25 mg subcutaneously every 7 days for 28 days.  - semaglutide (OZEMPIC) 2 MG/3ML pen; Inject 0.5 mg subcutaneously every 7 days for 28 days.  - Semaglutide, 1 MG/DOSE, (OZEMPIC) 4 MG/3ML pen; Inject 1 mg subcutaneously every 7 days for 28 days.  - Adult Eye  Referral; Future  - Glucose    Lower extremity edema of unclear etiology  Undiagnosed diastolic murmur  Previous HTN - now normotensive with better fluid control  She has chronic bilateral lower extremity swelling. Previous PCP had ordered echo and sleep test, neither have been completed just yet. She changed bumex dose to 0.5mg TID and is tolerating this ok with better edema control than BID. No signs or symptoms of DVT - bilateral quality of edema mainly worse around ankles with no chest pain/pressure, shortness of breath, PND, orthopnea plus edema improved with diuretic and no signs of infection - fever, redness, or warmth. Edema is improved compared to last visit in April.      Plan:   - Continue bumex 0.5mg TID. Monitor for adverse symptoms and let me know right away if any occur.   - Get echocardiogram to evaluate for HF and pulmonary hypertension  - Discussed sleep test but she declines.   - Elevate legs as much as possible. Consider compression stockings.   - Continue to reassess at follow-up visits for diabetes.       - bumetanide (BUMEX) 0.5 MG tablet; Take 1 tablet (0.5 mg) by mouth 3 times daily.      The longitudinal plan of care for the diagnosis(es)/condition(s) as documented were addressed during this visit. Due to the added complexity in care, I will continue to support Evette in the subsequent management and with ongoing continuity of care.    Subjective   Evette is a 70 year old, presenting for the following health issues:  RECHECK (1 month follow up )      5/15/2025      "9:53 AM   Additional Questions   Roomed by Rashida ESPOSITO     History of Present Illness       Diabetes:   She presents for follow up of diabetes.    She is not checking blood glucose.        She is concerned about other.    She is not experiencing numbness or burning in feet, excessive thirst, blurry vision, weight changes or redness, sores or blisters on feet. The patient has not had a diabetic eye exam in the last 12 months.          She eats 2-3 servings of fruits and vegetables daily.She consumes 1 sweetened beverage(s) daily.She exercises with enough effort to increase her heart rate 9 or less minutes per day.  She exercises with enough effort to increase her heart rate 4 days per week.   She is taking medications regularly.      Here for follow-up on diabetes and lower extremity swelling.                   Objective    /82 (BP Location: Right arm, Patient Position: Sitting, Cuff Size: Adult Regular)   Pulse 92   Temp 98  F (36.7  C) (Temporal)   Resp 16   Ht 1.702 m (5' 7\")   Wt 125.3 kg (276 lb 3.2 oz)   SpO2 96%   BMI 43.26 kg/m    Body mass index is 43.26 kg/m .  Physical Exam   GENERAL: alert and no distress  EYES: Eyes grossly normal to inspection, PERRL and conjunctivae and sclerae normal  RESP: lungs clear to auscultation - no rales, rhonchi or wheezes  CV: regular rate and rhythm, normal S1 S2, no S3 or S4, no murmur, click or rub, 1+ bilateral pitting edema of ankles - much better from last visit.   PSYCH: mentation appears normal, affect normal/bright          Signed Electronically by: Kwadwo Keita PA-C    "

## 2025-05-15 NOTE — PATIENT INSTRUCTIONS
Schedule eye exam  Complete cologuard   Consider RSV vaccine, Mammogram, and Dexa Scan.     Continue lantus 30 units daily.   Get CGM if you can (once affordable).   See Ozempic is covered BUT if not covered, tell pharmacist to let me know and you can also message me. Then I'll reach out and let you know next steps.

## 2025-05-16 ENCOUNTER — RESULTS FOLLOW-UP (OUTPATIENT)
Dept: FAMILY MEDICINE | Facility: CLINIC | Age: 70
End: 2025-05-16

## 2025-05-16 NOTE — RESULT ENCOUNTER NOTE
Dear Evette,     Glucose was improved compared to last time (but certain factors can affect this like when your last meal was). I would like you to continue with the plan we discussed at your appointment, which I outline below.     Plan:   Schedule eye exam  Complete cologuard   Consider RSV vaccine, Mammogram, and Dexa Scan.      Continue lantus 30 units daily.   Get CGM if you can (once affordable).   See Ozempic is covered BUT if not covered, tell pharmacist to let me know and you can also message me. Then I'll reach out and let you know next steps.     Let me know if you have any questions!     Best Regards,   Kwadwo Keita PA-C  [facility]

## 2025-05-19 ENCOUNTER — PATIENT OUTREACH (OUTPATIENT)
Dept: CARE COORDINATION | Facility: CLINIC | Age: 70
End: 2025-05-19
Payer: COMMERCIAL

## 2025-05-21 ENCOUNTER — VIRTUAL VISIT (OUTPATIENT)
Dept: FAMILY MEDICINE | Facility: CLINIC | Age: 70
End: 2025-05-21
Payer: COMMERCIAL

## 2025-05-21 DIAGNOSIS — E11.42 DIABETIC POLYNEUROPATHY ASSOCIATED WITH TYPE 2 DIABETES MELLITUS (H): Primary | ICD-10-CM

## 2025-05-21 PROCEDURE — 1126F AMNT PAIN NOTED NONE PRSNT: CPT | Performed by: PHYSICIAN ASSISTANT

## 2025-05-21 PROCEDURE — 98012 SYNCH AUDIO-ONLY EST SF 10: CPT | Performed by: PHYSICIAN ASSISTANT

## 2025-05-21 NOTE — PROGRESS NOTES
Evette is a 70 year old who is being evaluated via a billable telephone visit.    What phone number would you like to be contacted at? [phone]   How would you like to obtain your AVS? Evangelina  Originating Location (pt. Location): Home    Distant Location (provider location):  Off-site  Telephone visit completed due to the patient did not have access to video, while the distant provider did.    Assessment & Plan     (E11.42) Diabetic polyneuropathy associated with type 2 diabetes mellitus (H)  (primary encounter diagnosis)  Comment:   Plan:     Patient presents with a qualifying condition for medical cannabis through the state of [place].  [place] state registry updated today, pt aware of the next steps in certification and when to return for re-certification.  Follow up with treatment team for management of chronic condition      Subjective   Evette is a 70 year old, presenting for the following health issues:  Medication Request (Medical marijuana)      5/21/2025     7:38 AM   Additional Questions   Roomed by Rashida ESPOSITO     History of Present Illness       Diabetes:   She presents for follow up of diabetes.    She is not checking blood glucose.        She is concerned about other.    She is not experiencing numbness or burning in feet, excessive thirst, blurry vision, weight changes or redness, sores or blisters on feet. The patient has not had a diabetic eye exam in the last 12 months.          She eats 2-3 servings of fruits and vegetables daily.She consumes 1 sweetened beverage(s) daily.She exercises with enough effort to increase her heart rate 9 or less minutes per day.  She exercises with enough effort to increase her heart rate 4 days per week.   She is taking medications regularly.        Patient presents today for initial certification of medical cannabis.  She has a qualifying condition of diabetic polyneuropathy which has not been well-controlled, patient's pain is intractable and is affecting her daily living.       Objective    Vitals - Patient Reported  Pain Score: No Pain (0)        Physical Exam   General: Alert and no distress //Respiratory: No audible wheeze, cough, or shortness of breath // Psychiatric:  Appropriate affect, tone, and pace of words            Phone call duration: 11 minutes  Signed Electronically by: Clint Deleon PA-C

## 2025-06-19 ENCOUNTER — PATIENT OUTREACH (OUTPATIENT)
Dept: CARE COORDINATION | Facility: CLINIC | Age: 70
End: 2025-06-19
Payer: COMMERCIAL

## 2025-07-15 DIAGNOSIS — I89.0 LYMPHEDEMA: ICD-10-CM

## 2025-07-17 RX ORDER — BUMETANIDE 0.5 MG/1
0.5 TABLET ORAL 3 TIMES DAILY
Qty: 270 TABLET | Refills: 0 | Status: SHIPPED | OUTPATIENT
Start: 2025-07-17 | End: 2025-10-15